# Patient Record
Sex: MALE | Race: OTHER | HISPANIC OR LATINO
[De-identification: names, ages, dates, MRNs, and addresses within clinical notes are randomized per-mention and may not be internally consistent; named-entity substitution may affect disease eponyms.]

---

## 2017-03-28 ENCOUNTER — APPOINTMENT (OUTPATIENT)
Dept: FAMILY MEDICINE | Facility: HOSPITAL | Age: 37
End: 2017-03-28

## 2017-03-28 ENCOUNTER — OUTPATIENT (OUTPATIENT)
Dept: OUTPATIENT SERVICES | Facility: HOSPITAL | Age: 37
LOS: 1 days | End: 2017-03-28
Payer: SELF-PAY

## 2017-03-28 VITALS
WEIGHT: 217 LBS | DIASTOLIC BLOOD PRESSURE: 73 MMHG | BODY MASS INDEX: 32.14 KG/M2 | HEART RATE: 63 BPM | HEIGHT: 69 IN | TEMPERATURE: 98 F | OXYGEN SATURATION: 100 % | SYSTOLIC BLOOD PRESSURE: 127 MMHG | RESPIRATION RATE: 14 BRPM

## 2017-03-31 PROCEDURE — G0463: CPT

## 2017-03-31 PROCEDURE — 36415 COLL VENOUS BLD VENIPUNCTURE: CPT

## 2017-03-31 PROCEDURE — 83036 HEMOGLOBIN GLYCOSYLATED A1C: CPT

## 2017-09-14 ENCOUNTER — APPOINTMENT (OUTPATIENT)
Dept: FAMILY MEDICINE | Facility: HOSPITAL | Age: 37
End: 2017-09-14

## 2018-02-19 ENCOUNTER — EMERGENCY (EMERGENCY)
Facility: HOSPITAL | Age: 38
LOS: 1 days | Discharge: HOME | End: 2018-02-19
Admitting: EMERGENCY MEDICINE
Payer: MEDICAID

## 2018-02-19 PROCEDURE — 85027 COMPLETE CBC AUTOMATED: CPT

## 2018-02-19 PROCEDURE — 99284 EMERGENCY DEPT VISIT MOD MDM: CPT

## 2018-02-19 PROCEDURE — 93010 ELECTROCARDIOGRAM REPORT: CPT

## 2018-02-19 PROCEDURE — 93005 ELECTROCARDIOGRAM TRACING: CPT

## 2018-02-19 PROCEDURE — 96374 THER/PROPH/DIAG INJ IV PUSH: CPT

## 2018-02-19 PROCEDURE — 96361 HYDRATE IV INFUSION ADD-ON: CPT

## 2018-02-19 PROCEDURE — 80307 DRUG TEST PRSMV CHEM ANLYZR: CPT

## 2018-02-19 PROCEDURE — 99284 EMERGENCY DEPT VISIT MOD MDM: CPT | Mod: 25

## 2018-02-19 PROCEDURE — 80048 BASIC METABOLIC PNL TOTAL CA: CPT

## 2018-02-26 ENCOUNTER — APPOINTMENT (OUTPATIENT)
Dept: FAMILY MEDICINE | Facility: HOSPITAL | Age: 38
End: 2018-02-26

## 2018-02-26 ENCOUNTER — OUTPATIENT (OUTPATIENT)
Dept: OUTPATIENT SERVICES | Facility: HOSPITAL | Age: 38
LOS: 1 days | End: 2018-02-26
Payer: SELF-PAY

## 2018-02-26 VITALS
HEIGHT: 69 IN | SYSTOLIC BLOOD PRESSURE: 138 MMHG | RESPIRATION RATE: 16 BRPM | WEIGHT: 220 LBS | TEMPERATURE: 98.1 F | OXYGEN SATURATION: 100 % | BODY MASS INDEX: 32.58 KG/M2 | HEART RATE: 84 BPM | DIASTOLIC BLOOD PRESSURE: 90 MMHG

## 2018-02-26 DIAGNOSIS — Z00.00 ENCOUNTER FOR GENERAL ADULT MEDICAL EXAMINATION WITHOUT ABNORMAL FINDINGS: ICD-10-CM

## 2018-02-26 PROCEDURE — G0463: CPT

## 2018-02-28 DIAGNOSIS — F10.239 ALCOHOL DEPENDENCE WITH WITHDRAWAL, UNSPECIFIED: ICD-10-CM

## 2018-03-09 ENCOUNTER — EMERGENCY (EMERGENCY)
Facility: HOSPITAL | Age: 38
LOS: 1 days | Discharge: HOME | End: 2018-03-09
Admitting: EMERGENCY MEDICINE
Payer: MEDICAID

## 2018-03-09 PROCEDURE — 93005 ELECTROCARDIOGRAM TRACING: CPT

## 2018-03-09 PROCEDURE — 96360 HYDRATION IV INFUSION INIT: CPT

## 2018-03-09 PROCEDURE — 93010 ELECTROCARDIOGRAM REPORT: CPT

## 2018-03-09 PROCEDURE — 81003 URINALYSIS AUTO W/O SCOPE: CPT

## 2018-03-09 PROCEDURE — 99283 EMERGENCY DEPT VISIT LOW MDM: CPT | Mod: 25

## 2018-03-09 PROCEDURE — 80053 COMPREHEN METABOLIC PANEL: CPT

## 2018-03-09 PROCEDURE — 85027 COMPLETE CBC AUTOMATED: CPT

## 2018-03-09 PROCEDURE — 71046 X-RAY EXAM CHEST 2 VIEWS: CPT | Mod: 26

## 2018-03-09 PROCEDURE — 99284 EMERGENCY DEPT VISIT MOD MDM: CPT

## 2018-03-09 PROCEDURE — 71046 X-RAY EXAM CHEST 2 VIEWS: CPT

## 2018-03-09 PROCEDURE — 84443 ASSAY THYROID STIM HORMONE: CPT

## 2018-03-27 ENCOUNTER — APPOINTMENT (OUTPATIENT)
Dept: FAMILY MEDICINE | Facility: HOSPITAL | Age: 38
End: 2018-03-27

## 2018-03-27 ENCOUNTER — OUTPATIENT (OUTPATIENT)
Dept: OUTPATIENT SERVICES | Facility: HOSPITAL | Age: 38
LOS: 1 days | End: 2018-03-27
Payer: SELF-PAY

## 2018-03-27 VITALS
OXYGEN SATURATION: 100 % | DIASTOLIC BLOOD PRESSURE: 89 MMHG | HEART RATE: 65 BPM | SYSTOLIC BLOOD PRESSURE: 132 MMHG | WEIGHT: 216 LBS | RESPIRATION RATE: 14 BRPM | BODY MASS INDEX: 31.9 KG/M2 | TEMPERATURE: 97.6 F

## 2018-03-27 DIAGNOSIS — A04.8 OTHER SPECIFIED BACTERIAL INTESTINAL INFECTIONS: ICD-10-CM

## 2018-03-27 DIAGNOSIS — Z00.00 ENCOUNTER FOR GENERAL ADULT MEDICAL EXAMINATION WITHOUT ABNORMAL FINDINGS: ICD-10-CM

## 2018-03-27 DIAGNOSIS — Z86.39 PERSONAL HISTORY OF OTHER ENDOCRINE, NUTRITIONAL AND METABOLIC DISEASE: ICD-10-CM

## 2018-03-27 DIAGNOSIS — Z86.19 PERSONAL HISTORY OF OTHER INFECTIOUS AND PARASITIC DISEASES: ICD-10-CM

## 2018-03-27 PROCEDURE — 82043 UR ALBUMIN QUANTITATIVE: CPT

## 2018-03-27 PROCEDURE — G0463: CPT

## 2018-03-27 RX ORDER — BLOOD-GLUCOSE METER
W/DEVICE KIT MISCELLANEOUS
Qty: 1 | Refills: 0 | Status: ACTIVE | COMMUNITY
Start: 2018-03-27 | End: 1900-01-01

## 2018-03-27 RX ORDER — DIMETHICONE 13 MG/ML
LOTION TOPICAL
Qty: 1 | Refills: 0 | Status: ACTIVE | COMMUNITY
Start: 2018-03-27 | End: 1900-01-01

## 2018-03-28 DIAGNOSIS — F41.9 ANXIETY DISORDER, UNSPECIFIED: ICD-10-CM

## 2018-03-28 DIAGNOSIS — E11.9 TYPE 2 DIABETES MELLITUS WITHOUT COMPLICATIONS: ICD-10-CM

## 2018-03-28 DIAGNOSIS — Z87.898 PERSONAL HISTORY OF OTHER SPECIFIED CONDITIONS: ICD-10-CM

## 2018-03-28 DIAGNOSIS — E66.9 OBESITY, UNSPECIFIED: ICD-10-CM

## 2018-04-07 ENCOUNTER — OUTPATIENT (OUTPATIENT)
Dept: OUTPATIENT SERVICES | Facility: HOSPITAL | Age: 38
LOS: 1 days | End: 2018-04-07
Payer: SELF-PAY

## 2018-04-07 DIAGNOSIS — E11.9 TYPE 2 DIABETES MELLITUS WITHOUT COMPLICATIONS: ICD-10-CM

## 2018-04-07 DIAGNOSIS — E66.9 OBESITY, UNSPECIFIED: ICD-10-CM

## 2018-04-07 PROCEDURE — 83036 HEMOGLOBIN GLYCOSYLATED A1C: CPT

## 2018-04-07 PROCEDURE — 80061 LIPID PANEL: CPT

## 2018-04-16 ENCOUNTER — APPOINTMENT (OUTPATIENT)
Dept: CARDIOLOGY | Facility: CLINIC | Age: 38
End: 2018-04-16

## 2018-05-04 LAB
HBA1C MFR BLD HPLC: 5.9
LDLC SERPL DIRECT ASSAY-MCNC: 140
MICROALBUMIN/CREAT 24H UR-RTO: NORMAL

## 2018-05-17 ENCOUNTER — RESULT CHARGE (OUTPATIENT)
Age: 38
End: 2018-05-17

## 2018-05-18 ENCOUNTER — APPOINTMENT (OUTPATIENT)
Dept: CARDIOLOGY | Facility: HOSPITAL | Age: 38
End: 2018-05-18
Payer: MEDICAID

## 2018-05-18 ENCOUNTER — OUTPATIENT (OUTPATIENT)
Dept: OUTPATIENT SERVICES | Facility: HOSPITAL | Age: 38
LOS: 1 days | End: 2018-05-18
Payer: SELF-PAY

## 2018-05-18 VITALS
RESPIRATION RATE: 16 BRPM | SYSTOLIC BLOOD PRESSURE: 129 MMHG | WEIGHT: 209 LBS | TEMPERATURE: 98.5 F | BODY MASS INDEX: 30.86 KG/M2 | HEART RATE: 70 BPM | OXYGEN SATURATION: 100 % | DIASTOLIC BLOOD PRESSURE: 78 MMHG

## 2018-05-18 DIAGNOSIS — Z00.00 ENCOUNTER FOR GENERAL ADULT MEDICAL EXAMINATION WITHOUT ABNORMAL FINDINGS: ICD-10-CM

## 2018-05-18 PROCEDURE — 93010 ELECTROCARDIOGRAM REPORT: CPT

## 2018-05-18 PROCEDURE — 93005 ELECTROCARDIOGRAM TRACING: CPT

## 2018-05-18 PROCEDURE — G0463: CPT

## 2018-05-21 DIAGNOSIS — Z87.828 PERSONAL HISTORY OF OTHER (HEALED) PHYSICAL INJURY AND TRAUMA: ICD-10-CM

## 2018-05-25 ENCOUNTER — OUTPATIENT (OUTPATIENT)
Dept: OUTPATIENT SERVICES | Facility: HOSPITAL | Age: 38
LOS: 1 days | End: 2018-05-25
Payer: SELF-PAY

## 2018-05-25 DIAGNOSIS — Z87.898 PERSONAL HISTORY OF OTHER SPECIFIED CONDITIONS: ICD-10-CM

## 2018-05-25 PROCEDURE — 93227 XTRNL ECG REC<48 HR R&I: CPT

## 2018-05-25 PROCEDURE — 93016 CV STRESS TEST SUPVJ ONLY: CPT

## 2018-05-25 PROCEDURE — 93306 TTE W/DOPPLER COMPLETE: CPT

## 2018-05-25 PROCEDURE — 93306 TTE W/DOPPLER COMPLETE: CPT | Mod: 26

## 2018-05-25 PROCEDURE — 93226 XTRNL ECG REC<48 HR SCAN A/R: CPT

## 2018-05-25 PROCEDURE — 93225 XTRNL ECG REC<48 HRS REC: CPT

## 2018-05-25 PROCEDURE — 93018 CV STRESS TEST I&R ONLY: CPT

## 2018-05-25 PROCEDURE — 93017 CV STRESS TEST TRACING ONLY: CPT

## 2018-06-14 ENCOUNTER — OUTPATIENT (OUTPATIENT)
Dept: OUTPATIENT SERVICES | Facility: HOSPITAL | Age: 38
LOS: 1 days | End: 2018-06-14
Payer: SELF-PAY

## 2018-06-14 ENCOUNTER — APPOINTMENT (OUTPATIENT)
Dept: FAMILY MEDICINE | Facility: HOSPITAL | Age: 38
End: 2018-06-14

## 2018-06-14 VITALS
SYSTOLIC BLOOD PRESSURE: 124 MMHG | HEART RATE: 69 BPM | WEIGHT: 208 LBS | BODY MASS INDEX: 30.72 KG/M2 | RESPIRATION RATE: 16 BRPM | DIASTOLIC BLOOD PRESSURE: 75 MMHG | OXYGEN SATURATION: 98 % | TEMPERATURE: 98.1 F

## 2018-06-14 DIAGNOSIS — Z00.00 ENCOUNTER FOR GENERAL ADULT MEDICAL EXAMINATION WITHOUT ABNORMAL FINDINGS: ICD-10-CM

## 2018-06-14 PROCEDURE — G0463: CPT

## 2018-06-14 NOTE — PHYSICAL EXAM
[No Acute Distress] : no acute distress [Well Nourished] : well nourished [Well Developed] : well developed [Well-Appearing] : well-appearing [No Respiratory Distress] : no respiratory distress  [Clear to Auscultation] : lungs were clear to auscultation bilaterally [No Accessory Muscle Use] : no accessory muscle use [Normal Rate] : normal rate  [Regular Rhythm] : with a regular rhythm [Normal S1, S2] : normal S1 and S2 [No Edema] : there was no peripheral edema [Soft] : abdomen soft [Non Tender] : non-tender [Non-distended] : non-distended [de-identified] : midline scar

## 2018-06-14 NOTE — HISTORY OF PRESENT ILLNESS
[FreeTextEntry1] : CP, Palpitations [de-identified] : 38 year old male with past medical history of Pre-DM, and history of ETOH abuse, presenting with complaints of intermittent palpitations and chest pressure. Symptoms occur intermittently when he 'thinks about his heart' and have been occurring more frequently. Last episode occurred Monday night when he was trying to sleep. Associated symptoms include Dry mouth, tingling in his fingers, and Anxiety. Cardiology work-up including Stress Test and Holter Monitor has been within normal limits. \par

## 2018-06-16 DIAGNOSIS — F41.9 ANXIETY DISORDER, UNSPECIFIED: ICD-10-CM

## 2018-08-29 LAB — MICROALBUMIN/CREAT 24H UR-RTO: NORMAL

## 2018-09-11 ENCOUNTER — APPOINTMENT (OUTPATIENT)
Dept: FAMILY MEDICINE | Facility: HOSPITAL | Age: 38
End: 2018-09-11

## 2018-09-11 ENCOUNTER — OUTPATIENT (OUTPATIENT)
Dept: OUTPATIENT SERVICES | Facility: HOSPITAL | Age: 38
LOS: 1 days | End: 2018-09-11
Payer: SELF-PAY

## 2018-09-11 VITALS
SYSTOLIC BLOOD PRESSURE: 123 MMHG | OXYGEN SATURATION: 99 % | WEIGHT: 203 LBS | HEART RATE: 74 BPM | BODY MASS INDEX: 29.98 KG/M2 | TEMPERATURE: 98.4 F | DIASTOLIC BLOOD PRESSURE: 80 MMHG | RESPIRATION RATE: 16 BRPM

## 2018-09-11 DIAGNOSIS — Z00.00 ENCOUNTER FOR GENERAL ADULT MEDICAL EXAMINATION WITHOUT ABNORMAL FINDINGS: ICD-10-CM

## 2018-09-12 DIAGNOSIS — E11.9 TYPE 2 DIABETES MELLITUS WITHOUT COMPLICATIONS: ICD-10-CM

## 2018-09-12 NOTE — PLAN
[FreeTextEntry1] : 38M w/PMH of T2DM, EtOH abuse presents for a T2DM checkup\par \par #T2DM\par - Controlled w/ diet, exercise and metformin\par - Last A1c 5.9 April 2018\par - F/U new A1c, lipids\par \par #EtOH abuse\par - Pt reports attempts to cut down on drinking, MD encouraged further abstinence\par \par #HM\par - F/U CBC, CMP, TSH\par - Flu shot today\par \par Case discussed w/ Dr Reis

## 2018-09-12 NOTE — HISTORY OF PRESENT ILLNESS
[FreeTextEntry1] : F/U T2DM [de-identified] : 38M w/PMH of T2DM, EtOH abuse presents for a T2DM checkup. Over past 2 years pt has followed diet and has been adherent w/ aerobic exercise 3x /weekly and has lost 30 lbs. Pt has also been adherent w/ metformin. Pt decreased A1c from 6.9 to 5.9 as of April 2018. Pt inquired about ceasing metformin if labs today show glucose under control because he does not want to consider himself "sick." Pt has no complaints at this time and reports tolerating metformin well.

## 2018-09-15 ENCOUNTER — OUTPATIENT (OUTPATIENT)
Dept: OUTPATIENT SERVICES | Facility: HOSPITAL | Age: 38
LOS: 1 days | End: 2018-09-15
Payer: SELF-PAY

## 2018-09-15 PROCEDURE — 80053 COMPREHEN METABOLIC PANEL: CPT

## 2018-09-15 PROCEDURE — 80061 LIPID PANEL: CPT

## 2018-09-15 PROCEDURE — 84443 ASSAY THYROID STIM HORMONE: CPT

## 2018-09-15 PROCEDURE — 83036 HEMOGLOBIN GLYCOSYLATED A1C: CPT

## 2018-09-15 PROCEDURE — G0463: CPT

## 2018-09-15 PROCEDURE — 85025 COMPLETE CBC W/AUTO DIFF WBC: CPT

## 2018-09-18 ENCOUNTER — RESULT REVIEW (OUTPATIENT)
Age: 38
End: 2018-09-18

## 2018-09-18 LAB
ALBUMIN SERPL ELPH-MCNC: 4.7 G/DL
ALP BLD-CCNC: 76 U/L
ALT SERPL-CCNC: 21 U/L
ANION GAP SERPL CALC-SCNC: 13 MMOL/L
AST SERPL-CCNC: 22 U/L
BASOPHILS # BLD AUTO: 0.02 K/UL
BASOPHILS NFR BLD AUTO: 0.4 %
BILIRUB SERPL-MCNC: 0.3 MG/DL
BUN SERPL-MCNC: 18 MG/DL
CALCIUM SERPL-MCNC: 9.7 MG/DL
CHLORIDE SERPL-SCNC: 104 MMOL/L
CHOLEST SERPL-MCNC: 180 MG/DL
CHOLEST/HDLC SERPL: 2.9 RATIO
CO2 SERPL-SCNC: 24 MMOL/L
CREAT SERPL-MCNC: 0.65 MG/DL
EOSINOPHIL # BLD AUTO: 0.07 K/UL
EOSINOPHIL NFR BLD AUTO: 1.2 %
GLUCOSE SERPL-MCNC: 88 MG/DL
HBA1C MFR BLD HPLC: 5.6 %
HCT VFR BLD CALC: 39.2 %
HDLC SERPL-MCNC: 63 MG/DL
HGB BLD-MCNC: 13.1 G/DL
IMM GRANULOCYTES NFR BLD AUTO: 0.2 %
LDLC SERPL CALC-MCNC: 108 MG/DL
LYMPHOCYTES # BLD AUTO: 1.93 K/UL
LYMPHOCYTES NFR BLD AUTO: 34.4 %
MAN DIFF?: NORMAL
MCHC RBC-ENTMCNC: 29.6 PG
MCHC RBC-ENTMCNC: 33.4 GM/DL
MCV RBC AUTO: 88.7 FL
MONOCYTES # BLD AUTO: 0.58 K/UL
MONOCYTES NFR BLD AUTO: 10.3 %
NEUTROPHILS # BLD AUTO: 3 K/UL
NEUTROPHILS NFR BLD AUTO: 53.5 %
PLATELET # BLD AUTO: 248 K/UL
POTASSIUM SERPL-SCNC: 4.5 MMOL/L
PROT SERPL-MCNC: 7.4 G/DL
RBC # BLD: 4.42 M/UL
RBC # FLD: 14.1 %
SODIUM SERPL-SCNC: 141 MMOL/L
TRIGL SERPL-MCNC: 45 MG/DL
TSH SERPL-ACNC: 1.2 UIU/ML
WBC # FLD AUTO: 5.61 K/UL

## 2018-12-18 ENCOUNTER — OUTPATIENT (OUTPATIENT)
Dept: OUTPATIENT SERVICES | Facility: HOSPITAL | Age: 38
LOS: 1 days | End: 2018-12-18
Payer: SELF-PAY

## 2018-12-18 ENCOUNTER — APPOINTMENT (OUTPATIENT)
Dept: FAMILY MEDICINE | Facility: HOSPITAL | Age: 38
End: 2018-12-18

## 2018-12-18 ENCOUNTER — MED ADMIN CHARGE (OUTPATIENT)
Age: 38
End: 2018-12-18

## 2018-12-18 ENCOUNTER — RESULT CHARGE (OUTPATIENT)
Age: 38
End: 2018-12-18

## 2018-12-18 VITALS
TEMPERATURE: 97.9 F | BODY MASS INDEX: 31.01 KG/M2 | RESPIRATION RATE: 16 BRPM | SYSTOLIC BLOOD PRESSURE: 129 MMHG | OXYGEN SATURATION: 99 % | DIASTOLIC BLOOD PRESSURE: 86 MMHG | HEART RATE: 79 BPM | WEIGHT: 210 LBS

## 2018-12-18 DIAGNOSIS — Z00.00 ENCOUNTER FOR GENERAL ADULT MEDICAL EXAMINATION WITHOUT ABNORMAL FINDINGS: ICD-10-CM

## 2018-12-18 LAB — HBA1C MFR BLD HPLC: 6

## 2018-12-18 PROCEDURE — 82043 UR ALBUMIN QUANTITATIVE: CPT

## 2018-12-18 PROCEDURE — G0010: CPT

## 2018-12-18 PROCEDURE — 90471 IMMUNIZATION ADMIN: CPT

## 2018-12-18 PROCEDURE — G0009: CPT

## 2018-12-18 PROCEDURE — G0463: CPT

## 2018-12-18 NOTE — HISTORY OF PRESENT ILLNESS
[de-identified] : 39 YO M with DM2 diagnosed in 2016 with A1C of 6.9 presenting for follow up.  DM is controlled with last A1C of 5.6.  Has been compliant with Metformin 500mg BID.  Reports that he did not know that he was a diabetic, believed he was Pre-Diabetic.  Denies urinary frequency, neuropathy or visual changes.

## 2018-12-18 NOTE — ASSESSMENT
[FreeTextEntry1] : #DM2\par - Controlled\par - A1C: 6.0 in office today, increased from 5.6\par - Continue Metformin 500mg BID\par - Check urine micro\par \par #Need for Tdap, HepB#3 and PPSV23\par - Tdap and PPSV given in office today\par - Patient believes he received the 3rd HepB in another hospital, will bring records\par \par RTC in 3 months for DM follow up

## 2018-12-19 DIAGNOSIS — E11.9 TYPE 2 DIABETES MELLITUS WITHOUT COMPLICATIONS: ICD-10-CM

## 2018-12-20 LAB
CREAT SPEC-SCNC: 112 MG/DL
MICROALBUMIN 24H UR DL<=1MG/L-MCNC: <1.2 MG/DL
MICROALBUMIN/CREAT 24H UR-RTO: NORMAL

## 2019-02-12 ENCOUNTER — RX RENEWAL (OUTPATIENT)
Age: 39
End: 2019-02-12

## 2019-02-26 DIAGNOSIS — Z00.00 ENCOUNTER FOR GENERAL ADULT MEDICAL EXAMINATION WITHOUT ABNORMAL FINDINGS: ICD-10-CM

## 2019-06-03 ENCOUNTER — RX RENEWAL (OUTPATIENT)
Age: 39
End: 2019-06-03

## 2019-06-11 ENCOUNTER — APPOINTMENT (OUTPATIENT)
Dept: FAMILY MEDICINE | Facility: HOSPITAL | Age: 39
End: 2019-06-11

## 2019-08-05 ENCOUNTER — APPOINTMENT (OUTPATIENT)
Dept: FAMILY MEDICINE | Facility: HOSPITAL | Age: 39
End: 2019-08-05

## 2019-08-05 ENCOUNTER — OUTPATIENT (OUTPATIENT)
Dept: OUTPATIENT SERVICES | Facility: HOSPITAL | Age: 39
LOS: 1 days | End: 2019-08-05
Payer: SELF-PAY

## 2019-08-05 VITALS
RESPIRATION RATE: 14 BRPM | SYSTOLIC BLOOD PRESSURE: 127 MMHG | HEART RATE: 85 BPM | DIASTOLIC BLOOD PRESSURE: 87 MMHG | WEIGHT: 219 LBS | BODY MASS INDEX: 32.34 KG/M2 | OXYGEN SATURATION: 98 % | TEMPERATURE: 98.1 F

## 2019-08-05 DIAGNOSIS — Z00.00 ENCOUNTER FOR GENERAL ADULT MEDICAL EXAMINATION WITHOUT ABNORMAL FINDINGS: ICD-10-CM

## 2019-08-05 PROCEDURE — G0463: CPT

## 2019-08-05 NOTE — ASSESSMENT
[FreeTextEntry1] : 39M w/ PMH as above comes in for evaluation of back pain.\par \par 1. Acute back pain\par Continue Aleve/tylenol/motrin PRN\par Provided home back exercises to patient\par Will refer to PT and consider imaging if not improved by next visit\par \par RTC in 1 month for f/u back pain

## 2019-08-05 NOTE — PHYSICAL EXAM
[No Acute Distress] : no acute distress [Well Developed] : well developed [Well Nourished] : well nourished [No Spinal Tenderness] : no spinal tenderness [Well-Appearing] : well-appearing [Normal Affect] : the affect was normal [Alert and Oriented x3] : oriented to person, place, and time [Normal Insight/Judgement] : insight and judgment were intact [de-identified] : No back pain on palpation. No banding appreciated. Negative straight leg raise. No asymmetry appreciated. [de-identified] : N

## 2019-08-05 NOTE — HISTORY OF PRESENT ILLNESS
[FreeTextEntry8] : 39M w/ PMH of alcohol dependence, anxiety, DM2 controlled, fatty liver, obesity comes in requesting back XR. Pt reports ~1 week of back  pain, 2 locations - lower central back and mid central. Pain has been improving over past week. Saw chiropractor 3 weeks ago. Has not been taking any medications for pain except once took Aleve which helped pain. Denies pain radiating anywhere, paresthesias, numbness, weakness of legs, difficulty urinating/defecating. Does feel areas of numbness at lower back where pain is.\par \par Works in construction.

## 2019-08-06 DIAGNOSIS — M54.9 DORSALGIA, UNSPECIFIED: ICD-10-CM

## 2019-11-12 ENCOUNTER — RX RENEWAL (OUTPATIENT)
Age: 39
End: 2019-11-12

## 2019-12-04 ENCOUNTER — OUTPATIENT (OUTPATIENT)
Dept: OUTPATIENT SERVICES | Facility: HOSPITAL | Age: 39
LOS: 1 days | End: 2019-12-04
Payer: SELF-PAY

## 2019-12-04 ENCOUNTER — APPOINTMENT (OUTPATIENT)
Dept: FAMILY MEDICINE | Facility: HOSPITAL | Age: 39
End: 2019-12-04

## 2019-12-04 ENCOUNTER — RESULT CHARGE (OUTPATIENT)
Age: 39
End: 2019-12-04

## 2019-12-04 ENCOUNTER — MED ADMIN CHARGE (OUTPATIENT)
Age: 39
End: 2019-12-04

## 2019-12-04 DIAGNOSIS — Z00.00 ENCOUNTER FOR GENERAL ADULT MEDICAL EXAMINATION WITHOUT ABNORMAL FINDINGS: ICD-10-CM

## 2019-12-04 DIAGNOSIS — Z86.59 PERSONAL HISTORY OF OTHER MENTAL AND BEHAVIORAL DISORDERS: ICD-10-CM

## 2019-12-04 DIAGNOSIS — F10.239 ALCOHOL DEPENDENCE WITH WITHDRAWAL, UNSPECIFIED: ICD-10-CM

## 2019-12-04 PROCEDURE — G0463: CPT

## 2019-12-04 PROCEDURE — G0008: CPT

## 2019-12-04 RX ORDER — PAROXETINE HYDROCHLORIDE 20 MG/1
20 TABLET, FILM COATED ORAL DAILY
Qty: 30 | Refills: 0 | Status: DISCONTINUED | COMMUNITY
Start: 2018-06-14 | End: 2019-12-04

## 2019-12-06 DIAGNOSIS — K43.9 VENTRAL HERNIA WITHOUT OBSTRUCTION OR GANGRENE: ICD-10-CM

## 2019-12-06 DIAGNOSIS — E11.9 TYPE 2 DIABETES MELLITUS WITHOUT COMPLICATIONS: ICD-10-CM

## 2019-12-06 DIAGNOSIS — T15.92XA FOREIGN BODY ON EXTERNAL EYE, PART UNSPECIFIED, LEFT EYE, INITIAL ENCOUNTER: ICD-10-CM

## 2019-12-09 NOTE — PHYSICAL EXAM
[Normal] : no respiratory distress, lungs were clear to auscultation bilaterally and no accessory muscle use [FreeTextEntry1] : +midline incisional scar noted from prior laprotomy, +small hernia noted superior to the umbilicus on the right side of abbdominal wall adjaced to the incision, no evidence of incarceration or strangulation noted

## 2019-12-09 NOTE — ASSESSMENT
[FreeTextEntry1] : 40 y/o M with PMHx as above \par \par 1) Diabetes \par A1c:5.8\par continue with Metformin 500mg BID \par diet and exercise \par \par \par 2) Foreign body sensation of the left eye\par -ophthalmology referral given \par \par \par 3) Ventral Hernia \par -no evidence of incarceration or strangulation noted on the exam\par -general surgery referral given \par \par \par 4) Health Maintainence \par -flu vaccination given \par \par RTC in 3 months \par \par Pt seen, case and plan discussed with Dr. Benites.

## 2019-12-09 NOTE — HISTORY OF PRESENT ILLNESS
[FreeTextEntry1] : follow-up on diabetes  [de-identified] : 38 y/o with PMHx of alcohol dependence, anxiety, Diabetes, fatty liver and obesity present to clinic for f/u on diabetes. Reports compliance with his medications. Does not check his blood sugars at home. Pt also states that while working outside he noticed "something fell his eye". States happened 2 months ago. No medical evaluation at that time. No change in vision, no blurry vision. Denies contact use.

## 2019-12-09 NOTE — END OF VISIT
[FreeTextEntry3] : pt seen with resident at the time of visit and we discussed his management. [] : Resident

## 2019-12-09 NOTE — REVIEW OF SYSTEMS
[Itching] : itching [Negative] : Neurological [Pain] : no pain [Discharge] : no discharge [Redness] : no redness [Vision Problems] : no vision problems

## 2020-02-01 ENCOUNTER — TRANSCRIPTION ENCOUNTER (OUTPATIENT)
Age: 40
End: 2020-02-01

## 2020-02-02 ENCOUNTER — EMERGENCY (EMERGENCY)
Facility: HOSPITAL | Age: 40
LOS: 1 days | Discharge: HOME | End: 2020-02-02
Attending: EMERGENCY MEDICINE | Admitting: EMERGENCY MEDICINE
Payer: SUBSIDIZED

## 2020-02-02 VITALS
SYSTOLIC BLOOD PRESSURE: 132 MMHG | TEMPERATURE: 99 F | DIASTOLIC BLOOD PRESSURE: 87 MMHG | OXYGEN SATURATION: 98 % | RESPIRATION RATE: 18 BRPM | HEART RATE: 89 BPM | WEIGHT: 212.08 LBS | HEIGHT: 67 IN

## 2020-02-02 DIAGNOSIS — S01.551A OPEN BITE OF LIP, INITIAL ENCOUNTER: ICD-10-CM

## 2020-02-02 DIAGNOSIS — S01.511A LACERATION WITHOUT FOREIGN BODY OF LIP, INITIAL ENCOUNTER: ICD-10-CM

## 2020-02-02 PROCEDURE — 82962 GLUCOSE BLOOD TEST: CPT

## 2020-02-02 PROCEDURE — 99284 EMERGENCY DEPT VISIT MOD MDM: CPT | Mod: 25

## 2020-02-02 PROCEDURE — 99284 EMERGENCY DEPT VISIT MOD MDM: CPT

## 2020-02-02 PROCEDURE — 12013 RPR F/E/E/N/L/M 2.6-5.0 CM: CPT

## 2020-02-02 RX ADMIN — Medication 1 TABLET(S): at 17:03

## 2020-02-02 NOTE — CONSULT NOTE ADULT - PROBLEM SELECTOR RECOMMENDATION 9
pt informed to rinse mouth after each meal, clean suture line gently with soap and water, take antibiotics as prescribed, followup on 2/11/20 out patient or followup sooner in ER if pus drains.  Case discussed with ER team

## 2020-02-02 NOTE — ED PROVIDER NOTE - OBJECTIVE STATEMENT
Pt is 40 y/o male with PMhx of pre-DM here for eval s/p mechanical fall. As per pt he tripped over uneven curb and fell face forward, sustained laceration to Lt upper lip, no LOC reported, admits that he was drinking etoh today; Denies neck or back pain, denies HA, dizziness, visual changes, denies additional injuries, last tdap 4 months ago.

## 2020-02-02 NOTE — ED PROVIDER NOTE - NSFOLLOWUPINSTRUCTIONS_ED_ALL_ED_FT
PLEASE FINISH FULL COURSE OF ANTIBIOTICS AND KEEP THE AREA CLEAN;  Laceración facial  Facial Laceration  [place] laceración facial es un esthela (laceración) en la sangeetha. Puede sufrir belgica laceración facial debido a un accidente o belgica lesión que corta o desgarra la piel o los tejidos de la sangeetha. Las laceraciones faciales pueden sangrar y ser dolorosas. Es posible que necesite atención médica para detener el sangrado, ayudar a sanar la herida, disminuir el riesgo de infección y prevenir las cicatrices. Las laceraciones generalmente sanan rápidamente después del tratamiento.  ¿Cuáles son las causas?  Las laceraciones faciales a menudo son causadas por:  Un accidente automovilístico.[place] lesión deportiva.Un ataque violento.Belgica caída.¿Cuáles son los signos o los síntomas?  Los síntomas frecuentes de esta afección incluyen los siguientes:  Un esthela evidente en la sangeetha.Hemorragia.Dolor.Hinchazón.Moretones.Un cambio en la apariencia de la sangeetha (deformidad).¿Cómo se diagnostica?  Byrne médico puede diagnosticar belgica laceración facial mediante un examen físico y preguntándole cómo ocurrió la lesión. Byrne médico también verificará las áreas de sangrado, el daño tisular, la presencia de belgica lesión nerviosa y un cuerpo extraño en la herida.  ¿Cómo se trata?  El tratamiento de belgica laceración facial depende de qué porter grave y profunda sea la herida. También depende del riesgo de infección. [place], byrne médico limpiará la herida para prevenir belgica infección. A continuación, byrne médico decidirá si es necesario cerrar la herida. [place] dependerá de la profundidad de la laceración y del tiempo que haya pasado desde que ocurrió la lesión. Si el riesgo de infección es alto, la herida no se cerrará.  Si es necesario cerrar la herida:  Byrne médico usará puntos (suturas), goma para cerrar la piel (adhesivo para la piel) o tiras adhesivas para la piel para reparar la laceración.El médico nicole puede adormecer el área alrededor de la herida inyectando un medicamento anestésico (anestésico local) alrededor de la laceración antes de realizar las suturas.Los bordes de la piel rasgada o la piel muerta se pueden retirar.Si para cerrar la herida se usan suturas, la laceración puede cerrarse en capas. Se usarán suturas absorbibles para los tejidos profundos y los músculos. Se usarán suturas extraíbles para cerrar la piel.Le administrarán lo siguiente:  Analgésicos.Vacuna antitetánica.Antibióticos por vía oral.Ungüento antibiótico.Siga estas indicaciones en byrne casa:  Cuidado de la herida     Siga las indicaciones de byrne médico para el cuidado de la herida. Estas instrucciones variarán dependiendo de cómo se haya cerrado la herida.  Si tiene suturas:   Mantenga la herida limpia y seca.Si le colocaron belgica venda (vendaje), cámbiela al menos belgica vez al día, o teresa se lo haya indicado el médico. También cámbiela si se moja o se ensucia.Lave la herida con jabón y agua tibia dos veces al día, o teresa se lo haya indicado el médico. Enjuague el jabón con agua. Seque dando palmaditas con belgica toalla limpia y seca.Después de limpiarla, aplique belgica delgada capa de ungüento con antibiótico teresa se lo haya indicado el médico. [place] ayuda a prevenir infecciones y a evitar que el vendaje se adhiera a la herida.Puede ducharse después de las primeras 24 horas. No moje la herida hasta que le hayan quitado las suturas.Vuelva a que le quiten las suturas cuando se lo indique el médico.No use maquillaje hasta que el médico lo autorice.Si tiene adhesivo para la piel:   Podrá mojar momentáneamente la herida en la ducha o el baño.No frote ni sumerja la herida.No practique natación.No transpire con abundancia hasta que el adhesivo para la piel se haya caído solo.Después de ducharse o darse un baño, seque el esthela dando palmaditas con belgica toalla limpia.No aplique medicamentos líquidos, en crema o ungüentos, ni maquillaje en la herida mientras el adhesivo para la piel esté en byrne lugar. Podrá aflojarlo antes de que la herida se cure.Si tiene un vendaje sobre la herida, tenga cuidado de no aplicar cinta adhesiva directamente sobre el adhesivo para la piel. [place] puede hacer que el adhesivo se desprenda antes de que la herida se haya curado.No pase mucho tiempo al sol ni use belgica lámpara para bronceado mientras el adhesivo para la piel esté en byrne lugar.Por lo general, el adhesivo para la piel permanecerá en byrne lugar skye 5 a 10 días y, luego, se desprenderá naturalmente. No manosee el adhesivo para la piel.En rafael de que tenga tiras adhesivas:   Mantenga la herida limpia y seca.No permita que las tiras adhesivas para la piel se mojen.Báñese con cuidado para mantener la herida y las tiras adhesivas secas. Si la herida se moja, séquela sin frotar inmediatamente con belgica toalla limpia.Con el tiempo, las tiras adhesivas para la piel se caerán solas. Puede recortar las tiras a medida que la herida se nadeen. No quite las tiras adhesivas para la piel que aún estén pegadas a la herida.Instrucciones generales        Controle la raúl de la herida todos los días para detectar signos de infección. Esté atento a los siguientes signos:  Dolor, hinchazón o enrojecimiento.Líquido o minnie.Calor.Pus o mal olor.[place] los medicamentos de venta daphney y los recetados solamente teresa se lo haya indicado el médico.Si le recetaron un antibiótico, tómelo o aplíqueselo teresa se lo haya indicado el médico. No deje de usar el antibiótico, ni siquiera si el cuadro clínico mejora.Después de que la laceración haya sanado:  Sepa que puede demorar un año o dos para que el enrojecimiento o la cicatrización desaparezcan.Aplique protector solar sobre la piel de byrne herida curada para reducir al mínimo las cicatrices. Los hoang ultravioleta (UV) pueden oscurecer el tejido cicatricial.Comuníquese con un médico si:  Tiene fiebre.Tiene enrojecimiento, hinchazón o dolor alrededor de la herida.Observa líquido o minnie que salen de la herida.La herida está caliente al tacto.Tiene pus o percibe mal olor que emana de la herida.[place]ite ayuda de inmediato si:  Tiene belgica línea laura que sale de la herida.Resumen  Es posible que necesite tratamiento para belgica laceración facial a fin de evitar infecciones, detener el sangrado, ayudar a la curación y prevenir las cicatrices.Belg[place] laceración profunda puede cerrarse con puntos (suturas).Siga cuidadosamente las indicaciones del médico sobre el cuidado de la herida. PLEASE FINISH FULL COURSE OF ANTIBIOTICS AND KEEP THE AREA CLEAN;  Laceración facial  Facial Laceration  [place] laceración facial es un esthela (laceración) en la sangeetha. Puede sufrir belgica laceración facial debido a un accidente o belgica lesión que corta o desgarra la piel o los tejidos de la sangeetha. Las laceraciones faciales pueden sangrar y ser dolorosas. Es posible que necesite atención médica para detener el sangrado, ayudar a sanar la herida, disminuir el riesgo de infección y prevenir las cicatrices. Las laceraciones generalmente sanan rápidamente después del tratamiento.  ¿Cuáles son las causas?  Las laceraciones faciales a menudo son causadas por:  Un accidente automovilístico.Belgica lesión deportiva.Un ataque violento.Belgica caída.¿Cuáles son los signos o los síntomas?  Los síntomas frecuentes de esta afección incluyen los siguientes:  Un esthela evidente en la sangeetha.Hemorragia.Dolor.Hinchazón.[place]ton[place].Un cambio en la apariencia de la sangeetha (deformidad).¿Cómo se diagnostica?  Byrne médico puede diagnosticar belgica laceración facial mediante un examen físico y preguntándole cómo ocurrió la lesión. Byr[place] médico también verificará las áreas de sangrado, el daño tisular, la presencia de belgica lesión nerviosa y un cuerpo extraño en la herida.  ¿Cómo se trata?  El tratamiento de belgica laceración facial depende de qué porter grave y profunda sea la herida. También depende del riesgo de infección. [place], byrne médico limpiará la herida para prevenir belgica infección. A continuación, byrne médico decidirá si es necesario cerrar la herida. [place] dependerá de la profundidad de la laceración y del tiempo que haya pasado desde que ocurrió la lesión. Si el riesgo de infección es alto, la herida no se cerrará.  Si es necesario cerrar la herida:  Byrne médico usará puntos (suturas), goma para cerrar la piel (adhesivo para la piel) o tiras adhesivas para la piel para reparar la laceración.El médico vaughn puede adormecer el área alrededor de la herida inyectando un medicamento anestésico (anestésico local) alrededor de la laceración antes de realizar las suturas.Los bordes de la piel rasgada o la piel muerta se pueden retirar.Si para cerrar la herida se usan suturas, la laceración puede cerrarse en capas. Se usarán suturas absorbibles para los tejidos profundos y los músculos. Se usarán suturas extraíbles para cerrar la piel.Le administrarán lo siguiente:  Analgésicos.Vacuna antitetánica.Antibióticos por vía oral.Ungüento antibiótico.Siga estas indicaciones en byrne casa:  Cuidado de la herida     Siga las indicaciones de byrne médico para el cuidado de la herida. Estas instrucciones variarán dependiendo de cómo se haya cerrado la herida.  Si tiene suturas:   Mantenga la herida limpia y seca.Si le colocaron belgica venda (vendaje), cámbiela al menos belgica vez al día, o teresa se lo haya indicado el médico. También cámbiela si se moja o se ensucia.Lave la herida con jabón y agua tibia dos veces al día, o teresa se lo haya indicado el médico. Enjuague el jabón con agua. Seque dando palmaditas con belgica toalla limpia y seca.Después de limpiarla, aplique belgica delgada capa de ungüento con antibiótico teresa se lo haya indicado el médico. [place] ayuda a prevenir infecciones y a evitar que el vendaje se adhiera a la herida.Puede ducharse después de las primeras 24 horas. No moje la herida hasta que le hayan quitado las suturas.Vuelva a que le quiten las suturas cuando se lo indique el médico.No use maquillaje hasta que el médico lo autorice.Si tiene adhesivo para la piel:   Podrá mojar momentáneamente la herida en la ducha o el baño.No frote ni sumerja la herida.No practique natación.No transpire con abundancia hasta que el adhesivo para la piel se haya caído solo.Después de ducharse o darse un baño, seque el esthela dando palmaditas con belgica toalla limpia.No aplique medicamentos líquidos, en crema o ungüentos, ni maquillaje en la herida mientras el adhesivo para la piel esté en byrne lugar. Podrá aflojarlo antes de que la herida se cure.Si tiene un vendaje sobre la herida, tenga cuidado de no aplicar cinta adhesiva directamente sobre el adhesivo para la piel. [place] puede hacer que el adhesivo se desprenda antes de que la herida se haya curado.No pase mucho tiempo al sol ni use belgica lámpara para bronceado mientras el adhesivo para la piel esté en byrne lugar.Por lo general, el adhesivo para la piel permanecerá en byrne lugar skye 5 a 10 días y, luego, se desprenderá naturalmente. No manosee el adhesivo para la piel.En rafael de que tenga tiras adhesivas:   Mantenga la herida limpia y seca.No permita que las tiras adhesivas para la piel se mojen.Báñese con cuidado para mantener la herida y las tiras adhesivas secas. Si la herida se moja, séquela sin frotar inmediatamente con belgica toalla limpia.Con el tiempo, las tiras adhesivas para la piel se caerán solas. Puede recortar las tiras a medida que la herida se nadeen. No quite las tiras adhesivas para la piel que aún estén pegadas a la herida.Instrucciones generales        Controle la raúl de la herida todos los días para detectar signos de infección. Esté atento a los siguientes signos:  Dolor, hinchazón o enrojecimiento.Líquido o minnie.Calor.Pus o mal olor.[place] los medicamentos de venta daphney y los recetados solamente teresa se lo haya indicado el médico.Si le recetaron un antibiótico, tómelo o aplíqueselo teresa se lo haya indicado el médico. No deje de usar el antibiótico, ni siquiera si el cuadro clínico mejora.Después de que la laceración haya sanado:  Sepa que puede demorar un año o dos para que el enrojecimiento o la cicatrización desaparezcan.Aplique protector solar sobre la piel de byrne herida curada para reducir al mínimo las cicatrices. Los hoang ultravioleta (UV) pueden oscurecer el tejido cicatricial.Comuníquese con un médico si:  Tiene fiebre.Tiene enrojecimiento, hinchazón o dolor alrededor de la herida.Observa líquido o minnie que salen de la herida.La herida está caliente al tacto.Tiene pus o percibe mal olor que emana de la herida.[place] ayuda de inmediato si:  Tiene belgica línea laura que sale de la herida.Resumen  Es posible que necesite tratamiento para belgica laceración facial a fin de evitar infecciones, detener el sangrado, ayudar a la curación y prevenir las cicatrices.[place] laceración profunda puede cerrarse con puntos (suturas).Siga cuidadosamente las indicaciones del médico sobre el cuidado de la herida.  Mordeduras de animales en adultos  Animal Bite, Adult  Las heridas por mordeduras de animales pueden ser leves o graves. Es importante obtener tratamiento médico para prevenir belgica infección. Pregunte al médico si necesita tratamiento para prevenir belgica infección que pueda contagiarse de animales a seres humanos (sabrina).  Siga estas indicaciones en byrne casa:  Cuidados de la herida        Siga las indicaciones del médico en lo que respecta al cuidado de la herida. [place] lo siguiente:  Lávese las dona con agua y jabón antes de cambiar la venda (vendaje). Use un desinfectante para dona si no dispone de agua y jabón.Cambie el vendaje teresa se lo haya indicado el médico.No retire los puntos (suturas), la goma para cerrar la piel o las tiras adhesivas. [place] vez deban dejarse puestos en la piel skye 2 semanas o más tiempo. Si las tiras adhesivas se despegan y se enroscan, puede recortar los bordes sueltos. No retire las tiras adhesivas por completo a menos que el médico lo autorice.Controle la herida todos los días para detectar signos de infección. Esté atento a los siguientes signos:  Aumento del enrojecimiento, de la hinchazón o del dolor.Más líquido o minnie.Calor.Pus o mal olor.Medicamentos     [place] o aplique los medicamentos de venta daphney y los recetados solamente teresa se lo haya indicado el médico.Si le recetaron un antibiótico, tómelo o aplíquelo teresa se lo haya indicado el médico. No deje de usar el antibiótico aunque la herida mejore.Instrucciones generales        Cuando esté sentado o acostado, mantenga la raúl de la herida elevada por encima del nivel del corazón.Si se lo indican, aplique hielo sobre la raúl de la lesión.  Ponga el hielo en belgica bolsa plástica.Coloque belgica toalla entre la piel y la bolsa de hielo.Coloque el hielo skye 20 minutos, de 2 a 3 veces por día.Concurra a todas las visitas de control teresa se lo haya indicado el médico. [place] es importante.Comuníquese con un médico si:  Tiene más enrojecimiento, hinchazón o dolor alrededor de la herida.La herida está caliente al tacto.Tiene fiebre o siente escalofríos.Tiene belgica sensación de malestar general.Siente malestar estomacal (náuseas).Vomita.Tiene un dolor que no se ekta.Solicite ayuda de inmediato si:  Tiene belgica línea laura que sale de la herida.Alguna de estas sustancias emana de la herida:  Líquido no negrita.Más minnie.Pus o mal olor.Tiene dificultad para  la raúl de la herida.Pierde la sensibilidad (tiene adormecimiento) o siente hormigueo en cualquier parte del cuerpo.Resumen  Es importante recibir el tratamiento médico adecuado para mordeduras de animales. El tratamiento puede ayudar a evitar que contraiga belgica infección. Pregunte al médico si necesita tratamiento para prevenir belgica infección que pueda contagiarse de animales a seres humanos (sabrina).Controle la herida todos los días para detectar signos de infección, teresa aumento, en lugar de disminución, del enrojecimiento o la hinchazón.Obtenga ayuda de inmediato si tiene belgica línea laura que sale de la herida.Esta información no tiene teresa fin reemplazar el consejo del médico. Asegúrese de hacerle al médico cualquier pregunta que tenga. PLEASE FINISH FULL COURSE OF ANTIBIOTICS AND KEEP THE AREA CLEAN; don't shave; FOLLOW UP WITH DR PALACIOS ON TUESDAY 2/4;  Laceración facial  Facial Laceration  [place] laceración facial es un esthela (laceración) en la sangeetha. Puede sufrir belgica laceración facial debido a un accidente o belgica lesión que corta o desgarra la piel o los tejidos de la sangeetha. Las laceraciones faciales pueden sangrar y ser dolorosas. Es posible que necesite atención médica para detener el sangrado, ayudar a sanar la herida, disminuir el riesgo de infección y prevenir las cicatrices. Las laceraciones generalmente sanan rápidamente después del tratamiento.  ¿Cuáles son las causas?  Las laceraciones faciales a menudo son causadas por:  Un accidente automovilístico.Belgica lesión deportiva.Un ataque violento.[place] caída.¿Cuáles son los signos o los síntomas?  Los síntomas frecuentes de esta afección incluyen los siguientes:  Un esthela evidente en la sangeetha.Hemorragia.Dolor.Hinchazón.[place].Un cambio en la apariencia de la sangeetha (deformidad).¿Cómo se diagnostica?  Byrne médico puede diagnosticar belgica laceración facial mediante un examen físico y preguntándole cómo ocurrió la lesión. Byr[place] médico también verificará las áreas de sangrado, el daño tisular, la presencia de belgica lesión nerviosa y un cuerpo extraño en la herida.  ¿Cómo se trata?  El tratamiento de belgica laceración facial depende de qué porter grave y profunda sea la herida. También depende del riesgo de infección. [place], byrne médico limpiará la herida para prevenir belgica infección. A continuación, byrne médico decidirá si es necesario cerrar la herida. [place] dependerá de la profundidad de la laceración y del tiempo que haya pasado desde que ocurrió la lesión. Si el riesgo de infección es alto, la herida no se cerrará.  Si es necesario cerrar la herida:  Byrne médico usará puntos (suturas), goma para cerrar la piel (adhesivo para la piel) o tiras adhesivas para la piel para reparar la laceración.El médico vaughn puede adormecer el área alrededor de la herida inyectando un medicamento anestésico (anestésico local) alrededor de la laceración antes de realizar las suturas.Los bordes de la piel rasgada o la piel muerta se pueden retirar.Si para cerrar la herida se usan suturas, la laceración puede cerrarse en capas. Se usarán suturas absorbibles para los tejidos profundos y los músculos. Se usarán suturas extraíbles para cerrar la piel.Le administrarán lo siguiente:  Analgésicos.Vacuna antitetánica.Antibióticos por vía oral.Ungüento antibiótico.Siga estas indicaciones en byrne casa:  Cuidado de la herida     Siga las indicaciones de byrne médico para el cuidado de la herida. Estas instrucciones variarán dependiendo de cómo se haya cerrado la herida.  Si tiene suturas:   Mantenga la herida limpia y seca.Si le colocaron belgica venda (vendaje), cámbiela al menos belgica vez al día, o teresa se lo haya indicado el médico. También cámbiela si se moja o se ensucia.Lave la herida con jabón y agua tibia dos veces al día, o teresa se lo haya indicado el médico. Enjuague el jabón con agua. Seque dando palmaditas con belgica toalla limpia y seca.Después de limpiarla, aplique belgica delgada capa de ungüento con antibiótico teresa se lo haya indicado el médico. [place] ayuda a prevenir infecciones y a evitar que el vendaje se adhiera a la herida.Puede ducharse después de las primeras 24 horas. No moje la herida hasta que le hayan quitado las suturas.Vuelva a que le quiten las suturas cuando se lo indique el médico.No use maquillaje hasta que el médico lo autorice.Si tiene adhesivo para la piel:   Podrá mojar momentáneamente la herida en la ducha o el baño.No frote ni sumerja la herida.No practique natación.No transpire con abundancia hasta que el adhesivo para la piel se haya caído solo.Después de ducharse o darse un baño, seque el esthela dando palmaditas con belgica toalla limpia.No aplique medicamentos líquidos, en crema o ungüentos, ni maquillaje en la herida mientras el adhesivo para la piel esté en byrne lugar. Podrá aflojarlo antes de que la herida se cure.Si tiene un vendaje sobre la herida, tenga cuidado de no aplicar cinta adhesiva directamente sobre el adhesivo para la piel. [place] puede hacer que el adhesivo se desprenda antes de que la herida se haya curado.No pase mucho tiempo al sol ni use belgica lámpara para bronceado mientras el adhesivo para la piel esté en byrne lugar.Por lo general, el adhesivo para la piel permanecerá en byrne lugar skye 5 a 10 días y, luego, se desprenderá naturalmente. No manosee el adhesivo para la piel.En rafael de que tenga tiras adhesivas:   Mantenga la herida limpia y seca.No permita que las tiras adhesivas para la piel se mojen.Báñese con cuidado para mantener la herida y las tiras adhesivas secas. Si la herida se moja, séquela sin frotar inmediatamente con belgica toalla limpia.Con el tiempo, las tiras adhesivas para la piel se caerán solas. Puede recortar las tiras a medida que la herida se nadeen. No quite las tiras adhesivas para la piel que aún estén pegadas a la herida.Instrucciones generales        Controle la raúl de la herida todos los días para detectar signos de infección. Esté atento a los siguientes signos:  Dolor, hinchazón o enrojecimiento.Líquido o minnie.Calor.Pus o mal olor.[place] los medicamentos de venta daphney y los recetados solamente teresa se lo haya indicado el médico.Si le recetaron un antibiótico, tómelo o aplíqueselo teresa se lo haya indicado el médico. No deje de usar el antibiótico, ni siquiera si el cuadro clínico mejora.Después de que la laceración haya sanado:  Sepa que puede demorar un año o dos para que el enrojecimiento o la cicatrización desaparezcan.Aplique protector solar sobre la piel de byrne herida curada para reducir al mínimo las cicatrices. Los hoang ultravioleta (UV) pueden oscurecer el tejido cicatricial.Comuníquese con un médico si:  Tiene fiebre.Tiene enrojecimiento, hinchazón o dolor alrededor de la herida.Observa líquido o minnie que salen de la herida.La herida está caliente al tacto.Tiene pus o percibe mal olor que emana de la herida.[place] ayuda de inmediato si:  Tiene belgica línea laura que sale de la herida.Resumen  Es posible que necesite tratamiento para belgica laceración facial a fin de evitar infecciones, detener el sangrado, ayudar a la curación y prevenir las cicatrices.Belgica laceración profunda puede cerrarse con puntos (suturas).Siga cuidadosamente las indicaciones del médico sobre el cuidado de la herida.  Mordeduras de animales en adultos  Animal Bite, Adult  Las heridas por mordeduras de animales pueden ser leves o graves. Es importante obtener tratamiento médico para prevenir belgica infección. Pregunte al médico si necesita tratamiento para prevenir belgica infección que pueda contagiarse de animales a seres humanos (sabrina).  Siga estas indicaciones en byrne casa:  Cuidados de la herida        Siga las indicaciones del médico en lo que respecta al cuidado de la herida. [place] lo siguiente:  Lávese las dona con agua y jabón antes de cambiar la venda (vendaje). Use un desinfectante para dona si no dispone de agua y jabón.Cambie el vendaje teresa se lo haya indicado el médico.No retire los puntos (suturas), la goma para cerrar la piel o las tiras adhesivas. Stevo vez deban dejarse puestos en la piel skye 2 semanas o más tiempo. Si las tiras adhesivas se despegan y se enroscan, puede recortar los bordes sueltos. No retire las tiras adhesivas por completo a menos que el médico lo autorice.Controle la herida todos los días para detectar signos de infección. Esté atento a los siguientes signos:  Aumento del enrojecimiento, de la hinchazón o del dolor.Más líquido o minnie.Calor.Pus o mal olor.Medicamentos     [place] o aplique los medicamentos de venta daphney y los recetados solamente teresa se lo haya indicado el médico.Si le recetaron un antibiótico, tómelo o aplíquelo teresa se lo haya indicado el médico. No deje de usar el antibiótico aunque la herida mejore.Instrucciones generales        Cuando esté sentado o acostado, mantenga la raúl de la herida elevada por encima del nivel del corazón.Si se lo indican, aplique hielo sobre la raúl de la lesión.  Ponga el hielo en belgica bolsa plástica.Coloque belgica toalla entre la piel y la bolsa de hielo.Coloque el hielo skye 20 minutos, de 2 a 3 veces por día.Concurra a todas las visitas de control teresa se lo haya indicado el médico. [place] es importante.Comuníquese con un médico si:  Tiene más enrojecimiento, hinchazón o dolor alrededor de la herida.La herida está caliente al tacto.Tiene fiebre o siente escalofríos.Tiene belgica sensación de malestar general.Siente malestar estomacal (náuseas).Vomita.Tiene un dolor que no se ekta.Solicite ayuda de inmediato si:  Tiene belgica línea laura que sale de la herida.Alguna de estas sustancias emana de la herida:  Líquido no negrita.Más minnie.Pus o mal olor.Tiene dificultad para  la raúl de la herida.Pierde la sensibilidad (tiene adormecimiento) o siente hormigueo en cualquier parte del cuerpo.Resumen  Es importante recibir el tratamiento médico adecuado para mordeduras de animales. El tratamiento puede ayudar a evitar que contraiga belgica infección. Pregunte al médico si necesita tratamiento para prevenir belgica infección que pueda contagiarse de animales a seres humanos (sabrina).Controle la herida todos los días para detectar signos de infección, teresa aumento, en lugar de disminución, del enrojecimiento o la hinchazón.Obtenga ayuda de inmediato si tiene belgica línea laura que sale de la herida.Esta información no tiene teresa fin reemplazar el consejo del médico. Asegúrese de hacerle al médico cualquier pregunta que tenga.

## 2020-02-02 NOTE — ED PROVIDER NOTE - CARE PLAN
Principal Discharge DX:	Lip laceration Principal Discharge DX:	Lip laceration, initial encounter  Secondary Diagnosis:	Dog bite, initial encounter

## 2020-02-02 NOTE — ED PROVIDER NOTE - PROGRESS NOTE DETAILS
ARSLAN [place] - laceration repaired by Dr Borrego - pt admitted to her that he was bitten by friend's dog while trying to kiss the animal and didn't really trip and fell;  (+) UTD with rabies series; prophylaxed with Augmentin;

## 2020-02-02 NOTE — ED PROVIDER NOTE - ATTENDING CONTRIBUTION TO CARE
Dr. Ford: I performed a face to face bedside interview with patient regarding history of present illness, review of symptoms and past medical history. I completed an independent physical exam.  I have discussed patient's plan of care with PA.   I agree with note as stated above, having amended the EMR as needed to reflect my findings.   This includes HISTORY OF PRESENT ILLNESS, HIV, PAST MEDICAL/SURGICAL/FAMILY/SOCIAL HISTORY, ALLERGIES AND HOME MEDICATIONS, REVIEW OF SYSTEMS, PHYSICAL EXAM, and any PROGRESS NOTES during the time I functioned as the attending physician for this patient.    see mdm

## 2020-02-02 NOTE — ED ADULT NURSE NOTE - NSSEPSISSUSPECTED_ED_A_ED
FOLLOW UP NOTE    Subjective   Patient ID: Kamaljit is a 76 year old male.    Chief Complaint   Patient presents with   • Follow-up     echocardiogram 1 month, started new med 1 month ago, no side effects       HISTORY OF PRESENT ILLNESS:  Patient comes in for the follow-up  He describes an episode a few days ago while standing at the store at around noontime he suddenly felt lightheaded and felt he may pass out . he denies any palpitations at the time.  He also describes some achiness of the shoulders bilaterally and bilateral arm pains intermittently.  Although he is very active around the house.  And he is not short of breath.  He denies any syncopal episodes.  Since starting the Cardura last month he has felt better however his blood pressure at home has been usually about 130-140 mm systolic.  He denies any headache or visual disturbances.          Current Outpatient Medications   Medication Sig Dispense Refill   • doxazosin (CARDURA) 2 MG tablet Take 2 mg by mouth nightly.     • dilTIAZem (CARDIZEM CD) 180 MG 24 hr capsule Take 180 mg by mouth daily.     • lisinopril (PRINIVIL,ZESTRIL) 40 MG tablet Take 40 mg by mouth daily.       No current facility-administered medications for this visit.      ALLERGIES:   Allergen Reactions   • Levaquin ANAPHYLAXIS     unknown   • Atorvastatin MYALGIA   • Ezetimibe MYALGIA     unknown     Past Medical History:   Diagnosis Date   • Atrial fibrillation (CMS/HCC)    • CAD (coronary artery disease)    • Heart disease    • High cholesterol    • Hyperlipemia    • Hypertension    • MI (mitral incompetence)      Past Surgical History:   Procedure Laterality Date   • Back surgery     • Cardiac catherization  03/06/2012    pLAD 30%, mLAD 40%, dLAD 30%, dCx 90%, OM1 50%, OM2 50%, LPDA 90%, mRI 40%, mRCA 95%   • Cardioversion  03/06/2012    In ER, pt went into Vfib   • Stent implant  03/06/2012    Xience HEIDY   pRCA 2.25x23 and d Cx 2.25x18.     Family History   Problem Relation Age of  Onset   • Heart disease Mother    • Myocardial Infarction Father    • Kidney disease Brother      Social History     Tobacco Use   Smoking Status Never Smoker   Smokeless Tobacco Never Used     Social History     Substance and Sexual Activity   Drug Use No     Social History     Substance and Sexual Activity   Alcohol Use Yes   • Alcohol/week: 0.6 - 1.2 oz   • Types: 1 - 2 Cans of beer per week    Comment: daily       Patient's medications, allergies, past medical, surgical, social and family histories were reviewed and updated as appropriate.    Review of Systems   Constitution: Negative for decreased appetite, fever, malaise/fatigue and weight gain.   Eyes: Negative for vision loss in left eye, vision loss in right eye and visual disturbance.   Cardiovascular: Positive for chest pain, irregular heartbeat, near-syncope and palpitations. Negative for claudication, cyanosis, dyspnea on exertion, leg swelling, orthopnea, paroxysmal nocturnal dyspnea and syncope.   Respiratory: Negative for cough, shortness of breath, sleep disturbances due to breathing, snoring and wheezing.    Endocrine: Negative.    Skin: Negative for flushing, rash and suspicious lesions.   Musculoskeletal: Negative for back pain, joint pain, muscle weakness and myalgias.   Gastrointestinal: Negative for abdominal pain, anorexia, constipation, heartburn and melena.   Genitourinary: Negative for hematuria.   Neurological: Negative for difficulty with concentration, excessive daytime sleepiness, dizziness, headaches, light-headedness, loss of balance, numbness, paresthesias, seizures, sensory change, tremors and vertigo.   Psychiatric/Behavioral: The patient does not have insomnia and is not nervous/anxious.        Objective   Visit Vitals  /76   Pulse 52   Resp 12   Ht 5' 8\" (1.727 m)   Wt 79.7 kg (175 lb 11.3 oz)   BMI 26.72 kg/m²     Physical Exam   Constitutional: He appears healthy. No distress.   HENT:   Mouth/Throat: Dentition is normal.    Eyes: Conjunctivae are normal. Pupils are equal, round, and reactive to light.   Neck: Thyroid normal. Neck supple. No JVD present. No neck adenopathy. No thyromegaly present.   Cardiovascular: Normal rate, regular rhythm, S1 normal, S2 normal, intact distal pulses and normal pulses.  Occasional extrasystoles are present. Exam reveals no S3 and no S4.   Murmur heard.   Systolic murmur is present.  Pulmonary/Chest: Effort normal and breath sounds normal. No stridor. He has no wheezes. He has no rales. He exhibits no tenderness.   Abdominal: Soft. Bowel sounds are normal. He exhibits no distension and no mass. There is no splenomegaly or hepatomegaly. There is no tenderness. No hernia.   Musculoskeletal: He exhibits no edema, tenderness or deformity.   Neurological: He is alert and oriented to person, place, and time.   Skin: Skin is warm. No cyanosis. No jaundice. Nails show no clubbing.       No results found for this or any previous visit.    Assessment   (I35.0) Nonrheumatic aortic valve stenosis  (primary encounter diagnosis)    (I10) Essential hypertension, benign    (I25.10) Coronary artery disease involving native heart, angina presence unspecified, unspecified vessel or lesion type    (R07.2) Precordial pain    (I48.0) Paroxysmal atrial fibrillation (CMS/HCC)    (I49.01) Ventricular fibrillation (CMS/HCC)    I discussed with the patient about his prior coronary artery disease related symptoms.  Which is somewhat similar to his arm pain.  He does have mild to moderate aortic stenosis.  And he does have prior history of cardiac arrhythmias.  He said of the stress test I recommended him to undergo cardiac catheterization for further evaluation of coronary artery disease.  Afterwards he may need 30-day event monitor.  Potential evaluation for aortic stenosis including right and left heart catheterization may be necessary.  Cardiac catheterization and possible PCI and possibility of medical versus surgical  therapy were discussed.  Potential for complications including bleeding infection CVA thrombosis microinfarction or renal failure were explained and discussed.  Patient understands and wished to proceed.  Continue current medications.  He has not been able to tolerate beta-blockers in the past because of the extreme fatigue.  Recommend aspirin 81 mg daily.  Follow up: No Follow-up on file.    Donal Rivas MD     No

## 2020-02-02 NOTE — ED PROVIDER NOTE - PHYSICAL EXAMINATION
dentition intact   no TTP over mandible or maxilla  no malocclusion   no TTP over facial bones   irregular deep laceration to upper lt/mid lip area crossing both vermillion borders   (+) gynecomastia

## 2020-02-02 NOTE — ED ADULT NURSE NOTE - NSIMPLEMENTINTERV_GEN_ALL_ED
Implemented All Fall Risk Interventions:  [place] to call system. Call bell, personal items and telephone within reach. Instruct patient to call for assistance. Room bathroom lighting operational. Non-slip footwear when patient is off stretcher. Physically safe environment: no spills, clutter or unnecessary equipment. Stretcher in lowest position, wheels locked, appropriate side rails in place. Provide visual cue, wrist band, yellow gown, etc. Monitor gait and stability. Monitor for mental status changes and reorient to person, place, and time. Review medications for side effects contributing to fall risk. Reinforce activity limits and safety measures with patient and family.

## 2020-02-02 NOTE — ED PROVIDER NOTE - CLINICAL SUMMARY MEDICAL DECISION MAKING FREE TEXT BOX
Dr. Ford: 39M h/o pre-DM, tdap utd, p/w lip lac. Earlier today pt had a mechanical fall and landed on hip lip, no other injuries. No LOC. On exam pt is well appearing, nad, no C spine ttp, 2cm V shaped deep laceration over left upper crossing vermilion border twice. Rest of exam unremarkable. Dr. Borrego from plastics to repair lac. Dr. Ford: 39M h/o pre-DM, tdap utd, p/w lip lac. Earlier today pt had a mechanical fall and landed on hip lip, no other injuries. No LOC. On exam pt is well appearing, nad, no C spine ttp, 2cm V shaped deep laceration over left upper crossing vermilion border twice. Rest of exam unremarkable. Dr. Borrego from plastics to repair lac.  Upon further questioning pt's lac is from a dog bite. Dog's immunizations UTD.

## 2020-02-02 NOTE — ED PROVIDER NOTE - SEVERITY
PAIN SCALE 5 OF 10. Double O-Z Plasty Text: The defect edges were debeveled with a #15 scalpel blade.  Given the location of the defect, shape of the defect and the proximity to free margins a Double O-Z plasty (double transposition flap) was deemed most appropriate.  Using a sterile surgical marker, the appropriate transposition flaps were drawn incorporating the defect and placing the expected incisions within the relaxed skin tension lines where possible. The area thus outlined was incised deep to adipose tissue with a #15 scalpel blade.  The skin margins were undermined to an appropriate distance in all directions utilizing iris scissors.  Hemostasis was achieved with electrocautery.  The flaps were then transposed into place, one clockwise and the other counterclockwise, and anchored with interrupted buried subcutaneous sutures.

## 2020-02-02 NOTE — ED ADULT NURSE REASSESSMENT NOTE - NS ED NURSE REASSESS COMMENT FT1
Plastics MD Elmo at bedside, Patient now mentioning that laceration was caused by a dog bite (friend's dog). Pt declining tetanus vaccine stating "I just had one 6 months ago" and pt declining rabbies vaccine stating "I know the dog it's not an outside dog". MD Liliana at bedside and aware

## 2020-02-02 NOTE — ED ADULT NURSE NOTE - CHPI ED NUR SYMPTOMS NEG
no rectal pain/no blood in mucus/no drainage/no purulent drainage/no redness/no vomiting/no fever/no chills

## 2020-02-02 NOTE — ED PROVIDER NOTE - PATIENT PORTAL LINK FT
You can access the FollowMyHealth Patient Portal offered by [facility] by registering at the following website: [email or web address]. By joining [employer]’s FollowMyHealth portal, you will also be able to view your health information using other applications (apps) compatible with our system.

## 2020-02-02 NOTE — ED PROVIDER NOTE - CARE PROVIDER_API CALL
Renee Borrego)  Plastic Surgery  [address] [place] [address], [address]  [place] [address]  Phone: [phone]  Fax: [phone]  Follow Up Time:

## 2020-02-02 NOTE — CONSULT NOTE ADULT - SUBJECTIVE AND OBJECTIVE BOX
39 y.o. male who initially reported he fell on street then changed mechanism of injury to dog bite.  Patient states he was new to his friend's [place] and was attempting to kiss dog when he was bitten.  The ER team was made aware of the additional history.  Patient has h/o diabetes, he takes metformin 500 mg daily.      Patient with alcohol on breath, see op note for injury findings

## 2020-02-10 LAB — HBA1C MFR BLD HPLC: 5.8

## 2020-02-11 ENCOUNTER — APPOINTMENT (OUTPATIENT)
Dept: OPHTHALMOLOGY | Facility: CLINIC | Age: 40
End: 2020-02-11

## 2020-02-12 PROBLEM — R73.03 PREDIABETES: Chronic | Status: ACTIVE | Noted: 2020-02-02

## 2020-03-04 ENCOUNTER — APPOINTMENT (OUTPATIENT)
Dept: SURGERY | Facility: HOSPITAL | Age: 40
End: 2020-03-04

## 2020-03-04 ENCOUNTER — OUTPATIENT (OUTPATIENT)
Dept: OUTPATIENT SERVICES | Facility: HOSPITAL | Age: 40
LOS: 1 days | End: 2020-03-04
Payer: SELF-PAY

## 2020-03-04 VITALS
BODY MASS INDEX: 32.14 KG/M2 | HEIGHT: 69 IN | DIASTOLIC BLOOD PRESSURE: 74 MMHG | RESPIRATION RATE: 14 BRPM | SYSTOLIC BLOOD PRESSURE: 118 MMHG | TEMPERATURE: 98.2 F | OXYGEN SATURATION: 99 % | HEART RATE: 79 BPM | WEIGHT: 217 LBS

## 2020-03-04 DIAGNOSIS — Z00.00 ENCOUNTER FOR GENERAL ADULT MEDICAL EXAMINATION WITHOUT ABNORMAL FINDINGS: ICD-10-CM

## 2020-03-04 PROCEDURE — G0463: CPT

## 2020-03-09 DIAGNOSIS — K43.9 VENTRAL HERNIA WITHOUT OBSTRUCTION OR GANGRENE: ICD-10-CM

## 2020-09-18 ENCOUNTER — APPOINTMENT (OUTPATIENT)
Dept: OPHTHALMOLOGY | Facility: CLINIC | Age: 40
End: 2020-09-18

## 2020-11-18 ENCOUNTER — RESULT CHARGE (OUTPATIENT)
Age: 40
End: 2020-11-18

## 2020-11-18 ENCOUNTER — OUTPATIENT (OUTPATIENT)
Dept: OUTPATIENT SERVICES | Facility: HOSPITAL | Age: 40
LOS: 1 days | End: 2020-11-18
Payer: SELF-PAY

## 2020-11-18 ENCOUNTER — APPOINTMENT (OUTPATIENT)
Dept: FAMILY MEDICINE | Facility: HOSPITAL | Age: 40
End: 2020-11-18

## 2020-11-18 VITALS
SYSTOLIC BLOOD PRESSURE: 144 MMHG | TEMPERATURE: 97.7 F | DIASTOLIC BLOOD PRESSURE: 89 MMHG | WEIGHT: 223 LBS | BODY MASS INDEX: 32.93 KG/M2 | HEART RATE: 88 BPM | OXYGEN SATURATION: 98 % | RESPIRATION RATE: 14 BRPM

## 2020-11-18 DIAGNOSIS — T15.92XA FOREIGN BODY ON EXTERNAL EYE, PART UNSPECIFIED, LEFT EYE, INITIAL ENCOUNTER: ICD-10-CM

## 2020-11-18 DIAGNOSIS — Z00.00 ENCOUNTER FOR GENERAL ADULT MEDICAL EXAMINATION W/OUT ABNORMAL FINDINGS: ICD-10-CM

## 2020-11-18 DIAGNOSIS — Z00.00 ENCOUNTER FOR GENERAL ADULT MEDICAL EXAMINATION WITHOUT ABNORMAL FINDINGS: ICD-10-CM

## 2020-11-18 DIAGNOSIS — M54.9 DORSALGIA, UNSPECIFIED: ICD-10-CM

## 2020-11-19 DIAGNOSIS — F41.9 ANXIETY DISORDER, UNSPECIFIED: ICD-10-CM

## 2020-11-19 NOTE — PLAN
[FreeTextEntry1] : \par \par \par \par #Dm2\par -A1c today 6.0\par -Discussed with patient about increasing metformin to 850mg BID however patient was very resistant to this idea and wanted to continue metformin 500mg BID. \par -Ordered Atorvastatin 40mg Qd. Patient mildly resistant with taking it but agreed eventually after our discussion of benefits of taking this medication, \par -Adviced patient to lose 5lbs in the next 3 months\par -Ordered Microalbumin/cr, cmp, lipid panel\par \par #Ciggarrete nicotine dependance\par -Patient refused to engage on the topic of smoking cessation. I asked the patient to take it into consideration for the future\par F/U in 3 months

## 2020-11-19 NOTE — HEALTH RISK ASSESSMENT
[Excellent] : ~his/her~ current health as excellent [Good] : ~his/her~  mood as  good [6-10] : 6-10 [Yes] : Yes [Monthly or less (1 pt)] : Monthly or less (1 point) [1 or 2 (0 pts)] : 1 or 2 (0 points) [Never (0 pts)] : Never (0 points) [No] : In the past 12 months have you used drugs other than those required for medical reasons? No [No falls in past year] : Patient reported no falls in the past year [0] : 2) Feeling down, depressed, or hopeless: Not at all (0) [Alone] : lives alone [Employed] : employed [Less Than High School] : less than high school [Single] : single [Feels Safe at Home] : Feels safe at home [Fully functional (bathing, dressing, toileting, transferring, walking, feeding)] : Fully functional (bathing, dressing, toileting, transferring, walking, feeding) [Fully functional (using the telephone, shopping, preparing meals, housekeeping, doing laundry, using] : Fully functional and needs no help or supervision to perform IADLs (using the telephone, shopping, preparing meals, housekeeping, doing laundry, using transportation, managing medications and managing finances) [Smoke Detector] : smoke detector [Patient/Caregiver not ready to engage] : Patient/Caregiver not ready to engage [Audit-CScore] : 1 [[id number]] : 0 [Change in mental status noted] : No change in mental status noted [Sexually Active] : not sexually active [High Risk Behavior] : no high risk behavior [Reports changes in hearing] : Reports no changes in hearing [Reports changes in vision] : Reports no changes in vision [Reports changes in dental health] : Reports no changes in dental health

## 2020-11-19 NOTE — HEALTH RISK ASSESSMENT
[Excellent] : ~his/her~ current health as excellent [Good] : ~his/her~  mood as  good [6-10] : 6-10 [Yes] : Yes [Monthly or less (1 pt)] : Monthly or less (1 point) [1 or 2 (0 pts)] : 1 or 2 (0 points) [Never (0 pts)] : Never (0 points) [No] : In the past 12 months have you used drugs other than those required for medical reasons? No [No falls in past year] : Patient reported no falls in the past year [0] : 2) Feeling down, depressed, or hopeless: Not at all (0) [Alone] : lives alone [Employed] : employed [Less Than High School] : less than high school [Single] : single [Feels Safe at Home] : Feels safe at home [Fully functional (bathing, dressing, toileting, transferring, walking, feeding)] : Fully functional (bathing, dressing, toileting, transferring, walking, feeding) [Fully functional (using the telephone, shopping, preparing meals, housekeeping, doing laundry, using] : Fully functional and needs no help or supervision to perform IADLs (using the telephone, shopping, preparing meals, housekeeping, doing laundry, using transportation, managing medications and managing finances) [Smoke Detector] : smoke detector [Patient/Caregiver not ready to engage] : Patient/Caregiver not ready to engage [Audit-CScore] : 1 [[id number]] : 0 [Change in mental status noted] : No change in mental status noted [Sexually Active] : not sexually active [High Risk Behavior] : no high risk behavior [Reports changes in hearing] : Reports no changes in hearing [Reports changes in vision] : Reports no changes in vision [Reports changes in dental health] : Reports no changes in dental health

## 2020-11-19 NOTE — PHYSICAL EXAM
[No Acute Distress] : no acute distress [Well Nourished] : well nourished [No Respiratory Distress] : no respiratory distress  [No Accessory Muscle Use] : no accessory muscle use [Clear to Auscultation] : lungs were clear to auscultation bilaterally [PERRL] : pupils equal round and reactive to light [Normal Outer Ear/Nose] : the outer ears and nose were normal in appearance [Normal Oropharynx] : the oropharynx was normal [No JVD] : no jugular venous distention [Normal Rate] : normal rate  [Regular Rhythm] : with a regular rhythm [Normal S1, S2] : normal S1 and S2 [No Carotid Bruits] : no carotid bruits [No Edema] : there was no peripheral edema [Soft] : abdomen soft [Non Tender] : non-tender [Non-distended] : non-distended [Normal Posterior Cervical Nodes] : no posterior cervical lymphadenopathy [Normal Anterior Cervical Nodes] : no anterior cervical lymphadenopathy [No CVA Tenderness] : no CVA  tenderness [No Spinal Tenderness] : no spinal tenderness [No Joint Swelling] : no joint swelling [Grossly Normal Strength/Tone] : grossly normal strength/tone [No Rash] : no rash [Coordination Grossly Intact] : coordination grossly intact [No Focal Deficits] : no focal deficits [Right Foot Was Examined] : Right foot ~C was examined [Left Foot Was Examined] : left foot ~C was examined [None] : no ulcers in either foot were found [] : both feet [TWNoteComboBox3] : +1 [TWNoteComboBox4] : 0

## 2020-11-19 NOTE — REVIEW OF SYSTEMS
[Fever] : no fever [Chills] : no chills [Pain] : no pain [Vision Problems] : no vision problems [Earache] : no earache [Hearing Loss] : no hearing loss [Chest Pain] : no chest pain [Palpitations] : no palpitations [Shortness Of Breath] : no shortness of breath [Wheezing] : no wheezing [Abdominal Pain] : no abdominal pain [Nausea] : no nausea [Dysuria] : no dysuria [Nocturia] : no nocturia [Frequency] : no frequency [Joint Pain] : no joint pain [Joint Stiffness] : no joint stiffness [Back Pain] : no back pain [Itching] : no itching [Skin Rash] : no skin rash [Headache] : no headache [Dizziness] : no dizziness

## 2020-12-08 ENCOUNTER — OUTPATIENT (OUTPATIENT)
Dept: OUTPATIENT SERVICES | Facility: HOSPITAL | Age: 40
LOS: 1 days | End: 2020-12-08

## 2020-12-08 DIAGNOSIS — K76.0 FATTY (CHANGE OF) LIVER, NOT ELSEWHERE CLASSIFIED: ICD-10-CM

## 2020-12-09 PROCEDURE — 80061 LIPID PANEL: CPT

## 2020-12-09 PROCEDURE — G0463: CPT

## 2020-12-09 PROCEDURE — 80053 COMPREHEN METABOLIC PANEL: CPT

## 2020-12-09 PROCEDURE — 82043 UR ALBUMIN QUANTITATIVE: CPT

## 2021-04-16 ENCOUNTER — EMERGENCY (EMERGENCY)
Facility: HOSPITAL | Age: 41
LOS: 1 days | Discharge: HOME | End: 2021-04-16
Attending: EMERGENCY MEDICINE | Admitting: EMERGENCY MEDICINE
Payer: SELF-PAY

## 2021-04-16 VITALS
RESPIRATION RATE: 18 BRPM | TEMPERATURE: 97 F | HEIGHT: 67 IN | DIASTOLIC BLOOD PRESSURE: 88 MMHG | WEIGHT: 225.09 LBS | OXYGEN SATURATION: 97 % | SYSTOLIC BLOOD PRESSURE: 150 MMHG | HEART RATE: 85 BPM

## 2021-04-16 PROCEDURE — 99283 EMERGENCY DEPT VISIT LOW MDM: CPT

## 2021-04-16 PROCEDURE — 99053 MED SERV 10PM-8AM 24 HR FAC: CPT

## 2021-04-16 RX ORDER — NEFAZODONE HYDROCHLORIDE 200 MG/1
1 TABLET ORAL
Qty: 10 | Refills: 0
Start: 2021-04-16 | End: 2021-04-25

## 2021-04-16 NOTE — ED PROVIDER NOTE - NSFOLLOWUPCLINICS_GEN_ALL_ED_FT
[facility]  Family Medicine  [address] [place] [id number]  Phone: [phone]  Fax:

## 2021-04-16 NOTE — ED PROVIDER NOTE - OBJECTIVE STATEMENT
pt with 6 days of insomnia, tried melatonin x1 without much benefit, denies SI/HI. denies depression

## 2021-04-16 NOTE — ED PROVIDER NOTE - NSFOLLOWUPINSTRUCTIONS_ED_ALL_ED_FT
-- Follow up with Family Practice in 48 hours.    -- Return to the ER for worsening or persistent symptoms, and/or ANY NEW OR CONCERNING SYMPTOMS.    -- If you have difficulty following up, please call: [phone] to obtain a [facility] doctor or specialist who takes your insurance in your area.

## 2021-04-16 NOTE — ED PROVIDER NOTE - PATIENT PORTAL LINK FT
You can access the FollowMyHealth Patient Portal offered by [facility] by registering at the following website: [email or web address]. By joining [employer]’s FollowMyHealth portal, you will also be able to view your health information using other applications (apps) compatible with our system.

## 2021-04-20 ENCOUNTER — NON-APPOINTMENT (OUTPATIENT)
Age: 41
End: 2021-04-20

## 2021-04-26 LAB
ALBUMIN SERPL ELPH-MCNC: 4.8 G/DL
ALP BLD-CCNC: 87 U/L
ALT SERPL-CCNC: 82 U/L
ANION GAP SERPL CALC-SCNC: 11 MMOL/L
AST SERPL-CCNC: 41 U/L
BILIRUB SERPL-MCNC: 0.5 MG/DL
BUN SERPL-MCNC: 14 MG/DL
CALCIUM SERPL-MCNC: 9.9 MG/DL
CHLORIDE SERPL-SCNC: 102 MMOL/L
CHOLEST SERPL-MCNC: 203 MG/DL
CO2 SERPL-SCNC: 28 MMOL/L
CREAT SERPL-MCNC: 0.91 MG/DL
CREAT SPEC-SCNC: 132 MG/DL
GLUCOSE SERPL-MCNC: 111 MG/DL
HDLC SERPL-MCNC: 64 MG/DL
LDLC SERPL CALC-MCNC: 124 MG/DL
MICROALBUMIN 24H UR DL<=1MG/L-MCNC: <1.2 MG/DL
MICROALBUMIN/CREAT 24H UR-RTO: NORMAL MG/G
NONHDLC SERPL-MCNC: 139 MG/DL
POTASSIUM SERPL-SCNC: 4.8 MMOL/L
PROT SERPL-MCNC: 7.3 G/DL
SODIUM SERPL-SCNC: 141 MMOL/L
TRIGL SERPL-MCNC: 72 MG/DL

## 2021-04-26 NOTE — HISTORY OF PRESENT ILLNESS
[FreeTextEntry1] : CPE/DM2  [de-identified] : Patient is a 39 y/o overwieght male with a hx of DM2 controlled with metformin.

## 2021-04-26 NOTE — HISTORY OF PRESENT ILLNESS
[FreeTextEntry1] : CPE/DM2  [de-identified] : Patient is a 39 y/o overwieght male with a hx of DM2 controlled with metformin.

## 2021-06-04 NOTE — ED ADULT NURSE NOTE - CHIEF COMPLAINT QUOTE
Pt c/o difficulty sleeping x 6 days. Pt is a 54 yo f who has been seen by cardiology, gi rheumatology primary care neurology: dr Garcia, Dr Viridiana wild has undergone upper and lower endoscopy, cardaic stress testing, autoimmune and infectious disease workup, neurologic evaluation including EMG.  pt states she has not heard back from any of her doctors and is panicked that she is dying.  Pt states today she called 911 because she was feeling panicked, had pins and needles in her hands and feet with chest tightness.    she is not a smoker nor drinker no dv no trauma lives at home alone has been working from home this past year as a  for [employer].  She states her numbnes causes her to have trouble walking but she denies weakness.  no other neuro sx.  on eval  pt has pressured speech, continues to repeat herself to emphasize her symptoms and then repeatedly interrupts me during my explanations and summaries of her statements and reflections back to her. her speech is clear and cogent.  heent nc at mmm perrla eomi no nystagmus she has good eye tracking wears eyeglasses  neck is supple she has paraspial trapzius muscle spasm to palpation  cor rrr no m r g  chest cta, abd soft nt nd no hsm no guard no rebound no cvat  ext normal motor sensory no edema normal pulses  neuro  normal no hypo/hyperreflexia normal tone no weakness normal sensation to light touch  psy pt very anxious no si no hi  plan ro metabolic considerations for pt's sx, ekg xray ct head pt has ha, consult with pt's cardiologist dr garcia including request for bedside consult by group, on review of labs k is low repleat k assess for hypomag and provide a xanax for anxiety.   pt and follow

## 2021-06-11 NOTE — ASSESSMENT
Recommend a visit to evaluate further. Urgent care would be good. Probably shouldn't wait until Monday.   [FreeTextEntry1] : #Anxiety/Panic attacks\par - Symptoms likely related to anxiety given Cardiology work-up is within normal limits\par - Start Paroxetine 20mg daily; counseled on GI and Sexual side-effects\par - Counseled on CBT\par \par FUV in 2-4 weeks for re-evaluation of symptoms

## 2021-08-25 ENCOUNTER — APPOINTMENT (OUTPATIENT)
Dept: FAMILY MEDICINE | Facility: HOSPITAL | Age: 41
End: 2021-08-25

## 2021-10-20 ENCOUNTER — APPOINTMENT (OUTPATIENT)
Dept: FAMILY MEDICINE | Facility: HOSPITAL | Age: 41
End: 2021-10-20

## 2021-10-20 ENCOUNTER — RESULT CHARGE (OUTPATIENT)
Age: 41
End: 2021-10-20

## 2021-10-20 ENCOUNTER — OUTPATIENT (OUTPATIENT)
Dept: OUTPATIENT SERVICES | Facility: HOSPITAL | Age: 41
LOS: 1 days | End: 2021-10-20
Payer: SELF-PAY

## 2021-10-20 VITALS
OXYGEN SATURATION: 98 % | SYSTOLIC BLOOD PRESSURE: 110 MMHG | TEMPERATURE: 96.8 F | WEIGHT: 220 LBS | BODY MASS INDEX: 32.49 KG/M2 | DIASTOLIC BLOOD PRESSURE: 75 MMHG | HEART RATE: 98 BPM | RESPIRATION RATE: 14 BRPM

## 2021-10-20 DIAGNOSIS — Z00.00 ENCOUNTER FOR GENERAL ADULT MEDICAL EXAMINATION WITHOUT ABNORMAL FINDINGS: ICD-10-CM

## 2021-10-20 DIAGNOSIS — Z86.59 PERSONAL HISTORY OF OTHER MENTAL AND BEHAVIORAL DISORDERS: ICD-10-CM

## 2021-10-20 PROCEDURE — 80053 COMPREHEN METABOLIC PANEL: CPT

## 2021-10-20 PROCEDURE — G0463: CPT

## 2021-10-20 PROCEDURE — 82043 UR ALBUMIN QUANTITATIVE: CPT

## 2021-10-20 PROCEDURE — 80061 LIPID PANEL: CPT

## 2021-10-20 RX ORDER — ATORVASTATIN CALCIUM 40 MG/1
40 TABLET, FILM COATED ORAL
Qty: 1 | Refills: 0 | Status: DISCONTINUED | COMMUNITY
Start: 2020-11-18 | End: 2021-10-20

## 2021-10-21 DIAGNOSIS — K76.0 FATTY (CHANGE OF) LIVER, NOT ELSEWHERE CLASSIFIED: ICD-10-CM

## 2021-10-21 DIAGNOSIS — E11.9 TYPE 2 DIABETES MELLITUS WITHOUT COMPLICATIONS: ICD-10-CM

## 2021-10-21 DIAGNOSIS — F17.210 NICOTINE DEPENDENCE, CIGARETTES, UNCOMPLICATED: ICD-10-CM

## 2021-10-21 DIAGNOSIS — Z23 ENCOUNTER FOR IMMUNIZATION: ICD-10-CM

## 2021-10-21 DIAGNOSIS — E66.9 OBESITY, UNSPECIFIED: ICD-10-CM

## 2021-10-22 NOTE — HISTORY OF PRESENT ILLNESS
[FreeTextEntry1] : Diabetes Follow up [de-identified] : Patient is a 42 y/o overweight male with a hx of DM2 controlled with diet who presents for DM2 follow up.  Patient reports feeling well. He reports smoking 5 cigarettes daily for the past 15 years. Currently patient denies the following; polyuria or polydipsia, hypoglycemic episodes, weight gain/loss, peripheral extremity tingling/pain. Patient reports mild right eye dryness but denies pain or purulent drainage. Patient reports poor compliance with his metformin 500mg BID and atorvastatin has not taken medication in months. Patient  doesn't check his BG levels. Last eye exam was never.\par

## 2021-10-22 NOTE — REVIEW OF SYSTEMS
[Redness] : redness [Dryness] : dryness [Fever] : no fever [Chills] : no chills [Discharge] : no discharge [Pain] : no pain [Vision Problems] : no vision problems [Itching] : no itching [Earache] : no earache [Hearing Loss] : no hearing loss [Chest Pain] : no chest pain [Palpitations] : no palpitations [Shortness Of Breath] : no shortness of breath [Wheezing] : no wheezing [Abdominal Pain] : no abdominal pain [Nausea] : no nausea [Dysuria] : no dysuria [Nocturia] : no nocturia [Frequency] : no frequency [Joint Pain] : no joint pain [Joint Stiffness] : no joint stiffness [Back Pain] : no back pain [Itching] : no itching [Skin Rash] : no skin rash [Headache] : no headache [Dizziness] : no dizziness [FreeTextEntry3] : Right EYe

## 2021-10-22 NOTE — PLAN
[FreeTextEntry1] : \par \par \par \par #Dm2\par -A1c today  6.9 from 6\par -Discussed with patient about  restarting metformin 500mg BID and increasing gradually\par -reOrdered Atorvastatin 40mg Qd. Patient mildly resistant with taking it but agreed eventually after our discussion of benefits of taking this medication. \par -Ordered Microalbumin/cr, cmp, lipid panel \par -Ordered optho referral\par \par #Cigarette nicotine dependance\par -Patient refused to stop smoking cessation at this time but is considering to stop next visit. I asked the patient to take it into consideration for the future. \par \par #NAFLD\par -Repeat LIpid Profile\par -Denies ETOH\par \par #Health\par -Flu and covid vaccine Refused\par \par F/U in 1 month  for CPE

## 2021-10-22 NOTE — PHYSICAL EXAM
[No Acute Distress] : no acute distress [Well Nourished] : well nourished [PERRL] : pupils equal round and reactive to light [Normal Outer Ear/Nose] : the outer ears and nose were normal in appearance [Normal Oropharynx] : the oropharynx was normal [No JVD] : no jugular venous distention [No Respiratory Distress] : no respiratory distress  [No Accessory Muscle Use] : no accessory muscle use [Clear to Auscultation] : lungs were clear to auscultation bilaterally [Normal Rate] : normal rate  [Regular Rhythm] : with a regular rhythm [Normal S1, S2] : normal S1 and S2 [No Carotid Bruits] : no carotid bruits [No Edema] : there was no peripheral edema [Soft] : abdomen soft [Non Tender] : non-tender [Non-distended] : non-distended [Normal Posterior Cervical Nodes] : no posterior cervical lymphadenopathy [Normal Anterior Cervical Nodes] : no anterior cervical lymphadenopathy [No CVA Tenderness] : no CVA  tenderness [No Spinal Tenderness] : no spinal tenderness [No Joint Swelling] : no joint swelling [Grossly Normal Strength/Tone] : grossly normal strength/tone [No Rash] : no rash [Coordination Grossly Intact] : coordination grossly intact [No Focal Deficits] : no focal deficits [Right Foot Was Examined] : Right foot ~C was examined [Left Foot Was Examined] : left foot ~C was examined [None] : no ulcers in either foot were found [] : both feet [TWNoteComboBox3] : +1 [TWNoteComboBox4] : 0

## 2021-11-17 ENCOUNTER — APPOINTMENT (OUTPATIENT)
Dept: FAMILY MEDICINE | Facility: HOSPITAL | Age: 41
End: 2021-11-17

## 2021-11-17 ENCOUNTER — OUTPATIENT (OUTPATIENT)
Dept: OUTPATIENT SERVICES | Facility: HOSPITAL | Age: 41
LOS: 1 days | End: 2021-11-17
Payer: SELF-PAY

## 2021-11-17 VITALS
RESPIRATION RATE: 14 BRPM | DIASTOLIC BLOOD PRESSURE: 73 MMHG | BODY MASS INDEX: 31.75 KG/M2 | SYSTOLIC BLOOD PRESSURE: 111 MMHG | HEART RATE: 71 BPM | WEIGHT: 215 LBS | OXYGEN SATURATION: 98 % | TEMPERATURE: 97 F

## 2021-11-17 DIAGNOSIS — F17.210 NICOTINE DEPENDENCE, CIGARETTES, UNCOMPLICATED: ICD-10-CM

## 2021-11-17 DIAGNOSIS — Z00.00 ENCOUNTER FOR GENERAL ADULT MEDICAL EXAMINATION WITHOUT ABNORMAL FINDINGS: ICD-10-CM

## 2021-11-17 DIAGNOSIS — K43.9 VENTRAL HERNIA W/OUT OBSTRUCTION OR GANGRENE: ICD-10-CM

## 2021-11-17 LAB
ALBUMIN SERPL ELPH-MCNC: 4.6 G/DL
ALP BLD-CCNC: 149 U/L
ALT SERPL-CCNC: 174 U/L
ANION GAP SERPL CALC-SCNC: 12 MMOL/L
AST SERPL-CCNC: 99 U/L
BILIRUB SERPL-MCNC: 0.4 MG/DL
BUN SERPL-MCNC: 15 MG/DL
CALCIUM SERPL-MCNC: 9.9 MG/DL
CHLORIDE SERPL-SCNC: 104 MMOL/L
CHOLEST SERPL-MCNC: 191 MG/DL
CO2 SERPL-SCNC: 24 MMOL/L
CREAT SERPL-MCNC: 0.66 MG/DL
CREAT SPEC-SCNC: 31 MG/DL
GLUCOSE SERPL-MCNC: 142 MG/DL
HBA1C MFR BLD HPLC: 6.9
HDLC SERPL-MCNC: 67 MG/DL
LDLC SERPL CALC-MCNC: 105 MG/DL
MICROALBUMIN 24H UR DL<=1MG/L-MCNC: <1.2 MG/DL
MICROALBUMIN/CREAT 24H UR-RTO: NORMAL MG/G
NONHDLC SERPL-MCNC: 123 MG/DL
POTASSIUM SERPL-SCNC: 4.5 MMOL/L
PROT SERPL-MCNC: 7 G/DL
SODIUM SERPL-SCNC: 140 MMOL/L
TRIGL SERPL-MCNC: 89 MG/DL

## 2021-11-17 PROCEDURE — 86708 HEPATITIS A ANTIBODY: CPT

## 2021-11-17 PROCEDURE — 86803 HEPATITIS C AB TEST: CPT

## 2021-11-17 PROCEDURE — 85025 COMPLETE CBC W/AUTO DIFF WBC: CPT

## 2021-11-17 PROCEDURE — G0463: CPT

## 2021-11-17 PROCEDURE — 86706 HEP B SURFACE ANTIBODY: CPT

## 2021-11-17 PROCEDURE — 36415 COLL VENOUS BLD VENIPUNCTURE: CPT

## 2021-11-17 PROCEDURE — 80053 COMPREHEN METABOLIC PANEL: CPT

## 2021-11-18 DIAGNOSIS — K76.0 FATTY (CHANGE OF) LIVER, NOT ELSEWHERE CLASSIFIED: ICD-10-CM

## 2021-11-18 RX ORDER — IBUPROFEN 200 MG
0.05 TABLET ORAL TWICE DAILY
Qty: 1 | Refills: 0 | Status: ACTIVE | COMMUNITY
Start: 2021-11-18 | End: 1900-01-01

## 2021-11-18 NOTE — PLAN
[FreeTextEntry1] : \par \par #Dm2\par -A1c today  6 from 6.9\par -Discussed with patient about  restarting metformin 500mg BID and increasing gradually\par -ReOrdered Atorvastatin 40mg Qd. Patient mildly resistant with taking it but agreed eventually after our discussion of benefits of taking this medication. \par -Microalbumin/cr unremarkable \par -Ordered optho referral\par \par #Cigarette nicotine dependance\par -Patient refused to stop smoking cessation at this time but is considering to stop next visit. I asked the patient to take it into consideration for the future. \par \par #Elevated Transaminases\par DDx: NAFLD vs hepatitis vs etoh(likely) \par #Ventral/insicional hernia\par -Lipid  \par -Ordered Hepatitis Panel\par -Completed hepatitis b series 2014\par -ASt 99 from 42\par - from 83\par -Alk phos 149 from 88\par -Fatty infiltration on Abdominal US in 2016\par -No abdominal hernia seen in CT Abdomen in 2014\par -Ordered Abdominal CT scan\par -Ordered Surgery consult for eval\par \par #Health\par -Flu and covid vaccine Refused\par \par

## 2021-11-18 NOTE — PHYSICAL EXAM
[No Acute Distress] : no acute distress [Well Nourished] : well nourished [PERRL] : pupils equal round and reactive to light [Normal Outer Ear/Nose] : the outer ears and nose were normal in appearance [Normal Oropharynx] : the oropharynx was normal [No JVD] : no jugular venous distention [No Respiratory Distress] : no respiratory distress  [No Accessory Muscle Use] : no accessory muscle use [Clear to Auscultation] : lungs were clear to auscultation bilaterally [Normal Rate] : normal rate  [Regular Rhythm] : with a regular rhythm [Normal S1, S2] : normal S1 and S2 [No Carotid Bruits] : no carotid bruits [No Edema] : there was no peripheral edema [Soft] : abdomen soft [Non Tender] : non-tender [Non-distended] : non-distended [Normal Posterior Cervical Nodes] : no posterior cervical lymphadenopathy [Normal Anterior Cervical Nodes] : no anterior cervical lymphadenopathy [No CVA Tenderness] : no CVA  tenderness [No Spinal Tenderness] : no spinal tenderness [No Joint Swelling] : no joint swelling [Grossly Normal Strength/Tone] : grossly normal strength/tone [No Rash] : no rash [Coordination Grossly Intact] : coordination grossly intact [No Focal Deficits] : no focal deficits [Right Foot Was Examined] : Right foot ~C was examined [Left Foot Was Examined] : left foot ~C was examined [None] : no ulcers in either foot were found [] : both feet [Normal Bowel Sounds] : normal bowel sounds [de-identified] : Well healed midline vertical insicional scar(2001) with a 3cmx 3cm bulge along the right side of incisional scar. Able to reduce bulge.  [TWNoteComboBox3] : +1 [TWNoteComboBox4] : 0

## 2021-11-18 NOTE — REVIEW OF SYSTEMS
[Redness] : redness [Dryness] : dryness [Fever] : no fever [Chills] : no chills [Discharge] : no discharge [Pain] : no pain [Vision Problems] : no vision problems [Itching] : no itching [Earache] : no earache [Hearing Loss] : no hearing loss [Chest Pain] : no chest pain [Palpitations] : no palpitations [Shortness Of Breath] : no shortness of breath [Wheezing] : no wheezing [Abdominal Pain] : no abdominal pain [Nausea] : no nausea [Constipation] : no constipation [Dysuria] : no dysuria [Nocturia] : no nocturia [Frequency] : no frequency [Joint Pain] : no joint pain [Joint Stiffness] : no joint stiffness [Back Pain] : no back pain [Itching] : no itching [Skin Rash] : no skin rash [Headache] : no headache [Dizziness] : no dizziness [FreeTextEntry3] : Right EYe

## 2021-11-18 NOTE — HISTORY OF PRESENT ILLNESS
[FreeTextEntry1] : CPE [de-identified] : Patient is a 42 y/o overweight male with a hx of DM2 controlled with diet who presents for CPE.  Patient reports feeling well since he stopped his heavy ETOH use 1 month ago. Denied withdrawal symptoms.Reports drinking beers 6-8 daily from age  36.   \par   \par \par He reports smoking 5 cigarettes daily for the past 15 years. Currently patient denies the following; polyuria or polydipsia, hypoglycemic episodes, weight gain/loss, peripheral extremity tingling/pain.Patient reports poor compliance with his metformin 500mg BID and atorvastatin has not taken medication in months. Patient reports increase bulge along his incisional scar(S/P open abdominal surgery in 2001 from traumatic injury).  Patient lifts heavy packages at work. Denies pain, constipation, N/V. Reports he is able to reduce bulge mannually.\par \par

## 2021-11-30 ENCOUNTER — OUTPATIENT (OUTPATIENT)
Dept: OUTPATIENT SERVICES | Facility: HOSPITAL | Age: 41
LOS: 1 days | End: 2021-11-30
Payer: SELF-PAY

## 2021-11-30 ENCOUNTER — RESULT REVIEW (OUTPATIENT)
Age: 41
End: 2021-11-30

## 2021-11-30 DIAGNOSIS — K43.9 VENTRAL HERNIA WITHOUT OBSTRUCTION OR GANGRENE: ICD-10-CM

## 2021-11-30 DIAGNOSIS — R74.01 ELEVATION OF LEVELS OF LIVER TRANSAMINASE LEVELS: ICD-10-CM

## 2021-11-30 PROCEDURE — 74176 CT ABD & PELVIS W/O CONTRAST: CPT | Mod: 26,MH

## 2021-11-30 PROCEDURE — 74176 CT ABD & PELVIS W/O CONTRAST: CPT | Mod: MH

## 2022-02-25 LAB
ALBUMIN SERPL ELPH-MCNC: 4.3 G/DL
ALP BLD-CCNC: 161 U/L
ALT SERPL-CCNC: 66 U/L
ANION GAP SERPL CALC-SCNC: 11 MMOL/L
AST SERPL-CCNC: 32 U/L
BASOPHILS # BLD AUTO: 0.01 K/UL
BASOPHILS NFR BLD AUTO: 0.2 %
BILIRUB SERPL-MCNC: 0.2 MG/DL
BUN SERPL-MCNC: 15 MG/DL
CALCIUM SERPL-MCNC: 9.5 MG/DL
CHLORIDE SERPL-SCNC: 106 MMOL/L
CO2 SERPL-SCNC: 23 MMOL/L
CREAT SERPL-MCNC: 0.68 MG/DL
EOSINOPHIL # BLD AUTO: 0.09 K/UL
EOSINOPHIL NFR BLD AUTO: 1.7 %
GLUCOSE SERPL-MCNC: 130 MG/DL
HBV SURFACE AB SER QL: NONREACTIVE
HCT VFR BLD CALC: 41.8 %
HCV AB SER QL: NONREACTIVE
HCV S/CO RATIO: 0.08 S/CO
HEPATITIS A IGG ANTIBODY: REACTIVE
HGB BLD-MCNC: 13.4 G/DL
IMM GRANULOCYTES NFR BLD AUTO: 0.2 %
LYMPHOCYTES # BLD AUTO: 1.87 K/UL
LYMPHOCYTES NFR BLD AUTO: 35.8 %
MAN DIFF?: NORMAL
MCHC RBC-ENTMCNC: 29.6 PG
MCHC RBC-ENTMCNC: 32.1 GM/DL
MCV RBC AUTO: 92.5 FL
MONOCYTES # BLD AUTO: 0.59 K/UL
MONOCYTES NFR BLD AUTO: 11.3 %
NEUTROPHILS # BLD AUTO: 2.65 K/UL
NEUTROPHILS NFR BLD AUTO: 50.8 %
PLATELET # BLD AUTO: 245 K/UL
POTASSIUM SERPL-SCNC: 4.3 MMOL/L
PROT SERPL-MCNC: 6.7 G/DL
RBC # BLD: 4.52 M/UL
RBC # FLD: 12.1 %
SODIUM SERPL-SCNC: 140 MMOL/L
WBC # FLD AUTO: 5.22 K/UL

## 2022-05-28 ENCOUNTER — EMERGENCY (EMERGENCY)
Facility: HOSPITAL | Age: 42
LOS: 1 days | Discharge: HOME | End: 2022-05-28
Attending: EMERGENCY MEDICINE | Admitting: EMERGENCY MEDICINE
Payer: MEDICAID

## 2022-05-28 VITALS
OXYGEN SATURATION: 97 % | RESPIRATION RATE: 15 BRPM | TEMPERATURE: 98 F | DIASTOLIC BLOOD PRESSURE: 82 MMHG | HEART RATE: 97 BPM | SYSTOLIC BLOOD PRESSURE: 145 MMHG

## 2022-05-28 VITALS
HEIGHT: 67 IN | RESPIRATION RATE: 17 BRPM | HEART RATE: 100 BPM | DIASTOLIC BLOOD PRESSURE: 100 MMHG | OXYGEN SATURATION: 96 % | TEMPERATURE: 98 F | WEIGHT: 235.01 LBS | SYSTOLIC BLOOD PRESSURE: 150 MMHG

## 2022-05-28 LAB
ALBUMIN SERPL ELPH-MCNC: 4.2 G/DL — SIGNIFICANT CHANGE UP (ref 3.3–5)
ALP SERPL-CCNC: 144 U/L — HIGH (ref 30–120)
ALT FLD-CCNC: 625 U/L DA — HIGH (ref 10–60)
AMPHET UR-MCNC: NEGATIVE — SIGNIFICANT CHANGE UP
ANION GAP SERPL CALC-SCNC: 14 MMOL/L — SIGNIFICANT CHANGE UP (ref 5–17)
AST SERPL-CCNC: 454 U/L — HIGH (ref 10–40)
BARBITURATES UR SCN-MCNC: NEGATIVE — SIGNIFICANT CHANGE UP
BASOPHILS # BLD AUTO: 0.04 K/UL — SIGNIFICANT CHANGE UP (ref 0–0.2)
BASOPHILS NFR BLD AUTO: 0.8 % — SIGNIFICANT CHANGE UP (ref 0–2)
BENZODIAZ UR-MCNC: NEGATIVE — SIGNIFICANT CHANGE UP
BILIRUB SERPL-MCNC: 1.2 MG/DL — SIGNIFICANT CHANGE UP (ref 0.2–1.2)
BUN SERPL-MCNC: 8 MG/DL — SIGNIFICANT CHANGE UP (ref 7–23)
CALCIUM SERPL-MCNC: 9.2 MG/DL — SIGNIFICANT CHANGE UP (ref 8.4–10.5)
CHLORIDE SERPL-SCNC: 93 MMOL/L — LOW (ref 96–108)
CO2 SERPL-SCNC: 24 MMOL/L — SIGNIFICANT CHANGE UP (ref 22–31)
COCAINE METAB.OTHER UR-MCNC: NEGATIVE — SIGNIFICANT CHANGE UP
CREAT SERPL-MCNC: 0.89 MG/DL — SIGNIFICANT CHANGE UP (ref 0.5–1.3)
EGFR: 110 ML/MIN/1.73M2 — SIGNIFICANT CHANGE UP
EOSINOPHIL # BLD AUTO: 0.01 K/UL — SIGNIFICANT CHANGE UP (ref 0–0.5)
EOSINOPHIL NFR BLD AUTO: 0.2 % — SIGNIFICANT CHANGE UP (ref 0–6)
ETHANOL SERPL-MCNC: 324 MG/DL — HIGH (ref 0–3)
GLUCOSE SERPL-MCNC: 280 MG/DL — HIGH (ref 70–99)
HCT VFR BLD CALC: 44.1 % — SIGNIFICANT CHANGE UP (ref 39–50)
HGB BLD-MCNC: 15 G/DL — SIGNIFICANT CHANGE UP (ref 13–17)
IMM GRANULOCYTES NFR BLD AUTO: 0.4 % — SIGNIFICANT CHANGE UP (ref 0–1.5)
LYMPHOCYTES # BLD AUTO: 1.27 K/UL — SIGNIFICANT CHANGE UP (ref 1–3.3)
LYMPHOCYTES # BLD AUTO: 24.4 % — SIGNIFICANT CHANGE UP (ref 13–44)
MCHC RBC-ENTMCNC: 29.5 PG — SIGNIFICANT CHANGE UP (ref 27–34)
MCHC RBC-ENTMCNC: 34 GM/DL — SIGNIFICANT CHANGE UP (ref 32–36)
MCV RBC AUTO: 86.8 FL — SIGNIFICANT CHANGE UP (ref 80–100)
METHADONE UR-MCNC: NEGATIVE — SIGNIFICANT CHANGE UP
MONOCYTES # BLD AUTO: 0.48 K/UL — SIGNIFICANT CHANGE UP (ref 0–0.9)
MONOCYTES NFR BLD AUTO: 9.2 % — SIGNIFICANT CHANGE UP (ref 2–14)
NEUTROPHILS # BLD AUTO: 3.38 K/UL — SIGNIFICANT CHANGE UP (ref 1.8–7.4)
NEUTROPHILS NFR BLD AUTO: 65 % — SIGNIFICANT CHANGE UP (ref 43–77)
NRBC # BLD: 0 /100 WBCS — SIGNIFICANT CHANGE UP (ref 0–0)
OPIATES UR-MCNC: NEGATIVE — SIGNIFICANT CHANGE UP
PCP SPEC-MCNC: SIGNIFICANT CHANGE UP
PCP UR-MCNC: NEGATIVE — SIGNIFICANT CHANGE UP
PLATELET # BLD AUTO: 180 K/UL — SIGNIFICANT CHANGE UP (ref 150–400)
POTASSIUM SERPL-MCNC: 4.1 MMOL/L — SIGNIFICANT CHANGE UP (ref 3.5–5.3)
POTASSIUM SERPL-SCNC: 4.1 MMOL/L — SIGNIFICANT CHANGE UP (ref 3.5–5.3)
PROT SERPL-MCNC: 8.4 G/DL — HIGH (ref 6–8.3)
RBC # BLD: 5.08 M/UL — SIGNIFICANT CHANGE UP (ref 4.2–5.8)
RBC # FLD: 13.1 % — SIGNIFICANT CHANGE UP (ref 10.3–14.5)
SARS-COV-2 RNA SPEC QL NAA+PROBE: SIGNIFICANT CHANGE UP
SODIUM SERPL-SCNC: 131 MMOL/L — LOW (ref 135–145)
THC UR QL: NEGATIVE — SIGNIFICANT CHANGE UP
TROPONIN I, HIGH SENSITIVITY RESULT: 7.7 NG/L — SIGNIFICANT CHANGE UP
WBC # BLD: 5.2 K/UL — SIGNIFICANT CHANGE UP (ref 3.8–10.5)
WBC # FLD AUTO: 5.2 K/UL — SIGNIFICANT CHANGE UP (ref 3.8–10.5)

## 2022-05-28 PROCEDURE — 99285 EMERGENCY DEPT VISIT HI MDM: CPT | Mod: 25

## 2022-05-28 PROCEDURE — 80053 COMPREHEN METABOLIC PANEL: CPT

## 2022-05-28 PROCEDURE — 80307 DRUG TEST PRSMV CHEM ANLYZR: CPT

## 2022-05-28 PROCEDURE — 36415 COLL VENOUS BLD VENIPUNCTURE: CPT

## 2022-05-28 PROCEDURE — 93005 ELECTROCARDIOGRAM TRACING: CPT

## 2022-05-28 PROCEDURE — 93010 ELECTROCARDIOGRAM REPORT: CPT

## 2022-05-28 PROCEDURE — 71045 X-RAY EXAM CHEST 1 VIEW: CPT

## 2022-05-28 PROCEDURE — 85025 COMPLETE CBC W/AUTO DIFF WBC: CPT

## 2022-05-28 PROCEDURE — 87635 SARS-COV-2 COVID-19 AMP PRB: CPT

## 2022-05-28 PROCEDURE — 71045 X-RAY EXAM CHEST 1 VIEW: CPT | Mod: 26

## 2022-05-28 PROCEDURE — 99285 EMERGENCY DEPT VISIT HI MDM: CPT

## 2022-05-28 PROCEDURE — 84484 ASSAY OF TROPONIN QUANT: CPT

## 2022-05-28 RX ORDER — METFORMIN HYDROCHLORIDE 850 MG/1
0 TABLET ORAL
Qty: 0 | Refills: 0 | DISCHARGE

## 2022-05-28 NOTE — ED PROVIDER NOTE - CARE PROVIDERS DIRECT ADDRESSES
,[email or web address],DirectAddress_Unknown ,[email or web address],DirectAddress_Unknown,DirectAddress_Unknown

## 2022-05-28 NOTE — ED PROVIDER NOTE - OBJECTIVE STATEMENT
42 year old male with history of alcohol abuse, pre-diabetes, and anxiety presents with chest pressure and increased anxiety after stopping drinking. reports alcohol abuse past 22 years and daily drinker. last drank 2 hours ago. reports chest pressure and anxiety after he stops drinking. chest pressure and anxiety started about 2 hours ago after his last drink. reports drinking 6 pack of alcohol this am. denies HI or SI. wants help to stop drinking. never been to detox or rehab. denies other drug use. denies history of withdrawal seizures   PCP "forgot name" 42 year old male with history of alcohol abuse, pre-diabetes, and anxiety presents with chest pressure and increased anxiety after stopping drinking. reports alcohol abuse past 22 years and daily drinker. last drank 2 hours ago. reports chest pressure and anxiety after he stops drinking. chest pressure and anxiety started about 2 hours ago after his last drink. reports drinking 6 pack of alcohol this am. denies HI or SI. wants help to stop drinking. never been to detox or rehab. denies other drug use.   PCP "forgot name" 42 year old male with history of alcohol abuse, pre-diabetes, and anxiety presents with chest pressure and increased anxiety after stopping drinking. reports alcohol abuse past 22 years and daily drinker. last drank 2 hours ago. reports chest pressure and anxiety after he stops drinking. chest pressure and anxiety started about 2 hours ago after his last drink (reports on and off chest pressure for many years, pressure same quality and character as previous chest pressure) . reports drinking 6 pack of alcohol this am. denies HI or SI. wants help to stop drinking. never been to detox or rehab. denies other drug use.   PCP "forgot name"

## 2022-05-28 NOTE — ED PROVIDER NOTE - NS ED ATTENDING STATEMENT MOD
This was a shared visit with the SAM. I reviewed and verified the documentation and independently performed the documented:

## 2022-05-28 NOTE — ED ADULT TRIAGE NOTE - BSA (M2)
2.17 [As Noted in HPI] : as noted in HPI [Earache] : no earache [Loss Of Hearing] : no hearing loss [Sore Throat] : no sore throat [Hoarseness] : no hoarseness [Negative] : Heme/Lymph

## 2022-05-28 NOTE — ED PROVIDER NOTE - NEUROLOGICAL, MLM
Alert and oriented, no focal deficits, no motor or sensory deficits. no tremors on exam. symmetric eyebrow raise and smile. elevates tongue and shoulder without difficulty

## 2022-05-28 NOTE — ED ADULT NURSE NOTE - OBJECTIVE STATEMENT
43yo female walked into ED, pt c/o "I have chest pain, im also an alcoholic". pt denies withdrawals and last alcoholic drink was "a six pack, this morning".

## 2022-05-28 NOTE — ED PROVIDER NOTE - NSFOLLOWUPINSTRUCTIONS_ED_ALL_ED_FT
follow up with primary care provider and cardiology, referrals provided if needed   resources for alcohol detox programs provided       Trastorno por consumo de bebidas alcohólicas    Alcohol Use Disorder      El trastorno por consumo de bebidas alcohólicas es belgica afección en la que el consumo de alcohol altera la lisbeth cotidiana. Las personas con esta afección consumen bebidas alcohólicas en exceso y no pueden controlar byrne consumo.    El trastorno por consumo de bebidas alcohólicas puede causar problemas graves en la lori física. Puede afectar al cerebro, al corazón y a otros órganos internos. Tonya trastorno puede aumentar el riesgo de padecer ciertos tipos de cáncer y causar problemas de lori mental, teresa depresión o ansiedad.      ¿Cuáles son las causas?    Esta afección se debe al consumo excesivo de alcohol con el transcurso del tiempo. Algunas personas que sufren esta afección beben para enfrentarse a situaciones negativas de la lisbeth o escapar de ellas. Otros beben para aliviar el dolor o los síntomas de belgica enfermedad mental.      ¿Qué incrementa el riesgo?    Es más probable que sufra esta afección si:  •Tiene antecedentes familiares de trastorno por consumo de bebidas alcohólicas.      •Byrne cultura fomenta el consumo de bebidas alcohólicas hasta el punto de emborracharse (intoxicación alcohólica).      •Tuvo un trastorno emocional o de conducta en la infancia.      •Ha sufrido abusos.    •Es adolescente y:  •Tiene un desempeño deficiente en la escuela.      •Recibe poca supervisión o guía.      •Actúa de forma impulsiva y le gusta kristie riesgos.          ¿Cuáles son los signos o síntomas?    Los síntomas de esta afección incluyen:  •Beber más de lo que desea.      •Intentar beber menos en varias ocasiones, sin éxito.      •Pasar mucho tiempo pensando en el alcohol, intentando conseguir alcohol, bebiendo o recuperándose de edison bebido.      •Continuar bebiendo incluso cuando esto le causa problemas graves en byrne lisbeth cotidiana.      •Beber cuando es peligroso hacerlo, por ejemplo, antes de conducir.      •Necesitar cada vez más alcohol para obtener el mismo efecto que busca (generar tolerancia).    •Tener síntomas de abstinencia al dejar de beber. Entre los síntomas de abstinencia se incluyen los siguientes:  •Dificultad para dormir, que produce cansancio (fatiga).      •Cambios en el estado de ánimo, con depresión y ansiedad.      •Síntomas físicos, teresa frecuencia cardíaca acelerada, respiración rápida, presión arterial elevada (hipertensión), fiebre, sudoración fría o náuseas.      •Convulsiones.      •Confusión grave.      •Gwen o sentir cosas que no existen (alucinaciones).      •Temblores que no se pueden controlar.          ¿Cómo se diagnostica?    Tonya trastorno se diagnostica mediante belgica evaluación. Para comenzar, el médico puede hacerle henrique o cuatro preguntas sobre byrne consumo de alcohol o entregarle belgica prueba sencilla que deberá realizar. [place] ayudará a obtener información nicholas sobre usted.    Pueden hacerle un examen físico o análisis. Pueden derivarlo a un terapeuta especializado en abuso de sustancias.      ¿Cómo se trata?     Con la educación, algunas personas con trastorno por consumo de bebidas alcohólicas pueden reducir byrne consumo. Muchas personas con tonya trastorno no son capaces de modificar byrne comportamiento por byrne cuenta y necesitan la ayuda de especialistas en abuso de sustancias. Estos especialistas son terapeutas que pueden ayudar a diagnosticar la gravedad de byrne trastorno y a decidir qué tipo de tratamiento necesita. Los tratamientos pueden incluir lo siguiente:  •Desintoxicación. La desintoxicación consiste en dejar de beber, con la supervisión y las instrucciones de los médicos. El médico puede recetarle medicamentos en la primera semana para ayudar a disminuir los síntomas de la abstinencia. La abstinencia puede ser peligrosa y potencialmente mortal. La desintoxicación puede realizarse en el hogar, en un ámbito extrahospitalario, en un centro de atención primaria, en un hospital o en un centro de tratamiento para abuso de sustancias.    •Asesoramiento psicológico. Puede incluir entrevistas motivacionales (EM), terapia familiar o terapia cognitivo-conductual (TCC). Lo realizan terapeutas profesionales o especializados en el tratamiento de pacientes que abusan de sustancias. Un terapeuta puede abordar cómo cambiar byrne comportamiento de consumo de bebidas alcohólicas y cómo mantener los cambios. La terapia tiene teresa objetivos:  •Identificar siobhan motivaciones positivas para cambiar.      •Identificar y evitar aquello que lo conduzca a beber alcohol.      •Enseñarle a planificar byrne cambio de comportamiento.      •Proponer sistemas de apoyo que puedan ayudarlo a mantener el cambio.      •Medicamentos. Los medicamentos pueden ayudar a tratar tonya trastorno de la siguiente manera:  •Disminuyen el deseo intenso de consumir.      •Reducen el sentimiento positivo que experimenta al beber alcohol.      •Causan belgica reacción física desagradable cuando irais alcohol (terapia de aversión).        •Grupos de apoyo mutuo teresa Alcohólicos Anónimos (AA). Estos grupos son dirigidos por personas que berger superado byrne problema con la bebida. Estos grupos brindan apoyo emocional, consejos y orientación.      Algunas personas con esta afección se benefician del tratamiento combinado que se ofrece en algunos centros de tratamiento especializados en el abuso de sustancias.      Siga estas instrucciones en byrne casa:     Medicamentos     •Use los medicamentos de venta daphney y los recetados solamente teresa se lo haya indicado el médico.      •Consulte antes de empezar a kristie cualquier medicamento, hierbas o suplementos nuevos.      Instrucciones generales     •Pídales a siobhan amistades y familiares que apoyen byrne decisión de mantenerse sobrio.      •Evite situaciones en las que se sirva alcohol.      •Sandhya un plan para lidiar con las situaciones tentadoras.      •Asista a grupos de apoyo con regularidad.      •Practique pasatiempos o actividades que le gusten.      • No conduzca después de beber alcohol.      •Concurra a todas las visitas de seguimiento teresa se lo haya indicado el médico. [place] es importante.        ¿Cómo se previene?  •Si irais alcohol:•Limite la cantidad que irais:  •De 0 a 1 medida por día para las mujeres que no estén embarazadas.      •De 0 a 2 medidas por día para los hombres.        •Esté atento a la cantidad de alcohol que hay en las bebidas que chang. En los [place], belgica medida equivale a belgica botella de cerveza de 12 oz (355 ml), un vaso de vino de 5 oz (148 ml) o un vaso de belgica bebida alcohólica de carol graduación de 1½ oz (44 ml).      •Si tiene belgica afección de lori mental, busque tratamiento. Lleve un estilo de lisbeth saludable, que puede incorporar lo siguiente:  •Meditación o respiración profunda.      •Realizar actividad física.      •Pasar tiempo en contacto con la naturaleza.       •Escuchar música.      •Charlar con algún familiar o amigo de confianza.      •Si eres adolescente:  •No bebas alcohol. Zonia las reuniones en las que puedas tener la tentación de beber.      •No tengas miedo de negarte si alguien te ofrece alcohol. Habla sin reservas acerca de por qué no quieres beber alcohol. Sé un ejemplo positivo para los demás al no consumir alcohol.      •Construye relaciones con amigos que no beban.          Dónde buscar más información    •Substance Abuse and Mental Health Services Administration (Administración de Servicios por Abuso de Sustancias y Lori Mental): samhsa.gov      •Alcohólicos Anónimos: aa.org        Comuníquese con un médico si:    •No puede kristie los medicamentos teresa se lo berger indicado.      •Siobhan síntomas empeoran o tiene síntomas de abstinencia cuando luis f de beber.      •Vuelve a comenzar a beber (recaída) y los síntomas empeoran.        Solicite ayuda de inmediato si:    •Tiene pensamientos acerca de lastimarse a usted mismo o a otras personas.      Si alguna vez siente que puede lastimarse o lastimar a otras personas, o tiene pensamientos de poner fin a byrne lisbeth, busque ayuda de inmediato. Diríjase al departamento de emergencias más cercano o:    • Comuníquese con el servicio de emergencias de byrne localidad (911 en los [place]).       • Llame a belgica línea de asistencia al suicida y atención en crisis teresa National Suicide Prevention Lifeline (Línea Nacional de Prevención del Suicidio) al 1-866.747.3574. Está disponible las 24 horas del día en los . [place].       • Envíe un mensaje de texto a la línea para casos de crisis al 121989 (en los . [place].).         Resumen    •El trastorno por consumo de bebidas alcohólicas es belgica afección en la que el consumo de alcohol altera la lisbeth cotidiana. Las personas con esta afección consumen bebidas alcohólicas en exceso y no pueden controlar byrne consumo.      •El tratamiento puede incluir desintoxicación, asesoramiento psicológico, medicamentos y grupos de apoyo.      •Pida ayuda a amigos y familiares. Evite situaciones en las que se sirva alcohol.      •Busque ayuda de inmediato si tiene pensamientos acerca de lastimarse a usted mismo o a otras personas.      Esta información no tiene teresa fin reemplazar el consejo del médico. Asegúrese de hacerle al médico cualquier pregunta que tenga.

## 2022-05-28 NOTE — ED PROVIDER NOTE - CARE PLAN
1 Principal Discharge DX:	Alcohol abuse   Principal Discharge DX:	Alcohol abuse  Secondary Diagnosis:	Elevated liver enzymes

## 2022-05-28 NOTE — ED ADULT NURSE NOTE - NSIMPLEMENTINTERV_GEN_ALL_ED
Implemented All Fall with Harm Risk Interventions:  [place] to call system. Call bell, personal items and telephone within reach. Instruct patient to call for assistance. Room bathroom lighting operational. Non-slip footwear when patient is off stretcher. Physically safe environment: no spills, clutter or unnecessary equipment. Stretcher in lowest position, wheels locked, appropriate side rails in place. Provide visual cue, wrist band, yellow gown, etc. Monitor gait and stability. Monitor for mental status changes and reorient to person, place, and time. Review medications for side effects contributing to fall risk. Reinforce activity limits and safety measures with patient and family. Provide visual clues: red socks.

## 2022-05-28 NOTE — ED PROVIDER NOTE - PROVIDER TOKENS
PROVIDER:[TOKEN:[[id number]],FOLLOWUP:[1-3 Days]],PROVIDER:[TOKEN:[[id number]],FOLLOWUP:[1-3 Days]] PROVIDER:[TOKEN:[[id number]],FOLLOWUP:[1-3 Days]],PROVIDER:[TOKEN:[[id number]],FOLLOWUP:[1-3 Days]],PROVIDER:[TOKEN:[[id number]],FOLLOWUP:[1-3 Days]]

## 2022-05-28 NOTE — ED PROVIDER NOTE - PATIENT PORTAL LINK FT
You can access the FollowMyHealth Patient Portal offered by [facility] by registering at the following website: [email or web address]. By joining [facility]’s FollowMyHealth portal, you will also be able to view your health information using other applications (apps) compatible with our system.

## 2022-05-28 NOTE — ED PROVIDER NOTE - CARE PROVIDER_API CALL
Mila Almanza)  Internal Medicine  [address] [place] [address]  Phone: [phone]  Fax: [phone]  Follow Up Time: 1-3 Days    Beto Watson)  Cardiovascular Disease; Internal Medicine  [address], [address]  [place] [address]  Phone: [phone]  Fax: [phone]  Follow Up Time: 1-3 Days   Mila Almanza)  Internal Medicine  [address] [place] [address]  Phone: [phone]  Fax: [phone]  Follow Up Time: 1-3 Days    Beto Watson)  Cardiovascular Disease; Internal Medicine  [address]  [place] [address]  Phone: [phone]  Fax: [phone]  Follow Up Time: 1-3 Days    Ar Carver ()  Internal Medicine  [address] [place] [address]  Phone: [phone]  Fax: [phone]  Follow Up Time: 1-3 Days

## 2022-05-28 NOTE — ED PROVIDER NOTE - PROGRESS NOTE DETAILS
spoke with , Dena. will see patient in ED Dena down to see patient. provided resources for detox program. no beds at this time at [facility] or [facility]. patient provided numbers for multiple detox programs. will follow up with primary care provider, referral provided if needed Dena down to see patient. provided resources for detox program. no beds at this time at [facility] or [facility]. patient provided numbers for multiple detox programs. will follow up with primary care provider, referral provided if needed  also discussed liver enzymes are elevated. history of same. referral to GI also provided. speech clear. ambulatory with steady gait

## 2022-12-22 NOTE — ED ADULT TRIAGE NOTE - CHIEF COMPLAINT QUOTE
c/o chest pains for a while pt drinks a lot and last drank just before coming to hospital Abbe Flap (Lower To Upper Lip) Text: The defect of the upper lip was assessed and measured.  Given the location and size of the defect, an Abbe flap was deemed most appropriate.  Using a sterile surgical marker, an appropriate Abbe flap was measured and drawn on the lower lip. Local anesthesia was then infiltrated. A scalpel was then used to incise the upper lip through and through the skin, vermilion, muscle and mucosa, leaving the flap pedicled on the opposite side.  The flap was then rotated and transferred to the lower lip defect.  The flap was then sutured into place with a three layer technique, closing the orbicularis oris muscle layer with subcutaneous buried sutures, followed by a mucosal layer and an epidermal layer.

## 2023-03-22 PROBLEM — F41.9 ANXIETY DISORDER, UNSPECIFIED: Chronic | Status: ACTIVE | Noted: 2022-05-28

## 2023-03-22 PROBLEM — F10.10 ALCOHOL ABUSE, UNCOMPLICATED: Chronic | Status: ACTIVE | Noted: 2022-05-28

## 2023-04-04 ENCOUNTER — OUTPATIENT (OUTPATIENT)
Dept: OUTPATIENT SERVICES | Facility: HOSPITAL | Age: 43
LOS: 1 days | End: 2023-04-04
Payer: SELF-PAY

## 2023-04-04 ENCOUNTER — RESULT CHARGE (OUTPATIENT)
Age: 43
End: 2023-04-04

## 2023-04-04 ENCOUNTER — APPOINTMENT (OUTPATIENT)
Dept: FAMILY MEDICINE | Facility: HOSPITAL | Age: 43
End: 2023-04-04

## 2023-04-04 VITALS
DIASTOLIC BLOOD PRESSURE: 83 MMHG | RESPIRATION RATE: 16 BRPM | BODY MASS INDEX: 34.56 KG/M2 | TEMPERATURE: 98.1 F | SYSTOLIC BLOOD PRESSURE: 143 MMHG | OXYGEN SATURATION: 100 % | WEIGHT: 234 LBS | HEART RATE: 99 BPM

## 2023-04-04 DIAGNOSIS — R22.9 LOCALIZED SWELLING, MASS AND LUMP, UNSPECIFIED: ICD-10-CM

## 2023-04-04 DIAGNOSIS — Z87.898 PERSONAL HISTORY OF OTHER SPECIFIED CONDITIONS: ICD-10-CM

## 2023-04-04 DIAGNOSIS — E66.9 OBESITY, UNSPECIFIED: ICD-10-CM

## 2023-04-04 DIAGNOSIS — R74.01 ELEVATION OF LEVELS OF LIVER TRANSAMINASE LEVELS: ICD-10-CM

## 2023-04-04 DIAGNOSIS — Z00.00 ENCOUNTER FOR GENERAL ADULT MEDICAL EXAMINATION WITHOUT ABNORMAL FINDINGS: ICD-10-CM

## 2023-04-04 DIAGNOSIS — K76.0 FATTY (CHANGE OF) LIVER, NOT ELSEWHERE CLASSIFIED: ICD-10-CM

## 2023-04-04 LAB — HBA1C MFR BLD HPLC: 6.5

## 2023-04-04 PROCEDURE — 83036 HEMOGLOBIN GLYCOSYLATED A1C: CPT

## 2023-04-04 PROCEDURE — G0463: CPT

## 2023-04-04 PROCEDURE — 82043 UR ALBUMIN QUANTITATIVE: CPT

## 2023-04-04 RX ORDER — ATORVASTATIN CALCIUM 40 MG/1
40 TABLET, FILM COATED ORAL
Qty: 1 | Refills: 0 | Status: COMPLETED | COMMUNITY
Start: 2021-10-20 | End: 2023-04-04

## 2023-04-04 NOTE — PHYSICAL EXAM
[Conjunctival Hyperemia Bilateral] : hyperemia [No Respiratory Distress] : no respiratory distress  [No Accessory Muscle Use] : no accessory muscle use [No Focal Deficits] : no focal deficits [Normal Gait] : normal gait [Normal] : affect was normal and insight and judgment were intact [Comprehensive Foot Exam Normal] : Right and left foot were examined and both feet are normal. No ulcers in either foot. Toes are normal and with full ROM.  Normal tactile sensation with monofilament testing throughout both feet

## 2023-04-05 LAB
CREAT SPEC-SCNC: 269 MG/DL
MICROALBUMIN 24H UR DL<=1MG/L-MCNC: <1.2 MG/DL
MICROALBUMIN/CREAT 24H UR-RTO: NORMAL MG/G

## 2023-04-07 DIAGNOSIS — E66.9 OBESITY, UNSPECIFIED: ICD-10-CM

## 2023-04-07 DIAGNOSIS — E11.9 TYPE 2 DIABETES MELLITUS WITHOUT COMPLICATIONS: ICD-10-CM

## 2023-04-07 DIAGNOSIS — R74.01 ELEVATION OF LEVELS OF LIVER TRANSAMINASE LEVELS: ICD-10-CM

## 2023-04-18 ENCOUNTER — NON-APPOINTMENT (OUTPATIENT)
Age: 43
End: 2023-04-18

## 2023-04-18 PROBLEM — E11.9 CONTROLLED DIABETES MELLITUS: Status: ACTIVE | Noted: 2018-03-27

## 2023-04-19 ENCOUNTER — OUTPATIENT (OUTPATIENT)
Dept: OUTPATIENT SERVICES | Facility: HOSPITAL | Age: 43
LOS: 1 days | End: 2023-04-19
Payer: SELF-PAY

## 2023-04-19 ENCOUNTER — NON-APPOINTMENT (OUTPATIENT)
Age: 43
End: 2023-04-19

## 2023-04-19 ENCOUNTER — APPOINTMENT (OUTPATIENT)
Dept: FAMILY MEDICINE | Facility: HOSPITAL | Age: 43
End: 2023-04-19

## 2023-04-19 VITALS
RESPIRATION RATE: 16 BRPM | TEMPERATURE: 98.1 F | SYSTOLIC BLOOD PRESSURE: 116 MMHG | WEIGHT: 231 LBS | HEART RATE: 72 BPM | OXYGEN SATURATION: 98 % | BODY MASS INDEX: 34.11 KG/M2 | DIASTOLIC BLOOD PRESSURE: 78 MMHG

## 2023-04-19 DIAGNOSIS — Z00.00 ENCOUNTER FOR GENERAL ADULT MEDICAL EXAMINATION WITHOUT ABNORMAL FINDINGS: ICD-10-CM

## 2023-04-19 DIAGNOSIS — H04.123 DRY EYE SYNDROME OF BILATERAL LACRIMAL GLANDS: ICD-10-CM

## 2023-04-19 DIAGNOSIS — E11.9 TYPE 2 DIABETES MELLITUS W/OUT COMPLICATIONS: ICD-10-CM

## 2023-04-19 DIAGNOSIS — Z23 ENCOUNTER FOR IMMUNIZATION: ICD-10-CM

## 2023-04-19 PROCEDURE — G0463: CPT

## 2023-04-20 NOTE — PHYSICAL EXAM
[No Acute Distress] : no acute distress [Well Nourished] : well nourished [Normal Rate] : normal rate  [Regular Rhythm] : with a regular rhythm [Coordination Grossly Intact] : coordination grossly intact [No Focal Deficits] : no focal deficits [Normal Affect] : the affect was normal [Normal Insight/Judgement] : insight and judgment were intact [Well-Appearing] : well-appearing [Normal] : no rash [de-identified] : L eye with pterygium

## 2023-04-20 NOTE — HISTORY OF PRESENT ILLNESS
[FreeTextEntry1] : DM eye exam [de-identified] : Pt is a 44yo M w PMHx of T2DM, fatty liver, obesity presenting for funduscopic exam. Pt denies any acute complaints. Denies any changes in vision since last being seen. Pt's last HbA1C was 6.5 on 4/4/23. Pt's last CMP/Urine Creatine demonstrates normal renal function. Pt's diabetes is currently managed on metformin 500qd. Pt does not have next appt set up with PCP. No other questions or concerns at this time.

## 2023-04-21 DIAGNOSIS — E11.9 TYPE 2 DIABETES MELLITUS WITHOUT COMPLICATIONS: ICD-10-CM

## 2023-05-03 NOTE — HISTORY OF PRESENT ILLNESS
[FreeTextEntry1] : FU Type 2 DM and Fatty liver [de-identified] : 42 yo obese M with PMHx of T2DM, fatty liver presents for FU, pt has not been to clinic since Nov 2021, and since then reports has not taken medication or be compliant with diet and lifestyle changes. He states is eager to start diet and exercise for the summer.

## 2023-05-03 NOTE — END OF VISIT
[] : Resident [FreeTextEntry3] : agree with plan to restart metformin and obtain follow up labs for this patient, wieght loss and diet are paramount in his therapy

## 2023-08-08 RX ORDER — METFORMIN HYDROCHLORIDE 500 MG/1
500 TABLET, COATED ORAL DAILY
Qty: 90 | Refills: 1 | Status: ACTIVE | COMMUNITY
Start: 2021-10-20 | End: 1900-01-01

## 2023-08-17 ENCOUNTER — NON-APPOINTMENT (OUTPATIENT)
Age: 43
End: 2023-08-17

## 2023-08-17 ENCOUNTER — APPOINTMENT (OUTPATIENT)
Dept: OPHTHALMOLOGY | Facility: CLINIC | Age: 43
End: 2023-08-17
Payer: SELF-PAY

## 2023-08-17 PROCEDURE — 92004 COMPRE OPH EXAM NEW PT 1/>: CPT

## 2023-08-17 PROCEDURE — 92134 CPTRZ OPH DX IMG PST SGM RTA: CPT

## 2023-08-17 PROCEDURE — 92285 EXTERNAL OCULAR PHOTOGRAPHY: CPT

## 2023-08-18 ENCOUNTER — APPOINTMENT (OUTPATIENT)
Dept: FAMILY MEDICINE | Facility: HOSPITAL | Age: 43
End: 2023-08-18

## 2025-03-28 ENCOUNTER — OUTPATIENT (OUTPATIENT)
Dept: OUTPATIENT SERVICES | Facility: HOSPITAL | Age: 45
LOS: 1 days | End: 2025-03-28
Payer: SELF-PAY

## 2025-03-28 ENCOUNTER — APPOINTMENT (OUTPATIENT)
Age: 45
End: 2025-03-28

## 2025-03-28 VITALS
TEMPERATURE: 98.2 F | RESPIRATION RATE: 16 BRPM | DIASTOLIC BLOOD PRESSURE: 78 MMHG | SYSTOLIC BLOOD PRESSURE: 126 MMHG | HEART RATE: 86 BPM | BODY MASS INDEX: 35.4 KG/M2 | OXYGEN SATURATION: 98 % | HEIGHT: 69 IN | WEIGHT: 239 LBS

## 2025-03-28 DIAGNOSIS — E11.9 TYPE 2 DIABETES MELLITUS W/OUT COMPLICATIONS: ICD-10-CM

## 2025-03-28 DIAGNOSIS — Z00.00 ENCOUNTER FOR GENERAL ADULT MEDICAL EXAMINATION W/OUT ABNORMAL FINDINGS: ICD-10-CM

## 2025-03-28 DIAGNOSIS — F17.210 NICOTINE DEPENDENCE, CIGARETTES, UNCOMPLICATED: ICD-10-CM

## 2025-03-28 DIAGNOSIS — E66.9 OBESITY, UNSPECIFIED: ICD-10-CM

## 2025-03-28 DIAGNOSIS — Z23 ENCOUNTER FOR IMMUNIZATION: ICD-10-CM

## 2025-03-28 DIAGNOSIS — Z00.00 ENCOUNTER FOR GENERAL ADULT MEDICAL EXAMINATION WITHOUT ABNORMAL FINDINGS: ICD-10-CM

## 2025-03-28 LAB — HBA1C MFR BLD HPLC: 9.6

## 2025-03-28 RX ORDER — METFORMIN HYDROCHLORIDE 1000 MG/1
1000 TABLET, COATED ORAL TWICE DAILY
Qty: 180 | Refills: 3 | Status: ACTIVE | COMMUNITY
Start: 2025-03-28 | End: 1900-01-01

## 2025-03-29 DIAGNOSIS — E66.9 OBESITY, UNSPECIFIED: ICD-10-CM

## 2025-03-29 DIAGNOSIS — F17.210 NICOTINE DEPENDENCE, CIGARETTES, UNCOMPLICATED: ICD-10-CM

## 2025-03-29 DIAGNOSIS — E11.9 TYPE 2 DIABETES MELLITUS WITHOUT COMPLICATIONS: ICD-10-CM

## 2025-03-29 LAB
CREAT SPEC-SCNC: 257 MG/DL
MICROALBUMIN 24H UR DL<=1MG/L-MCNC: <1.2 MG/DL
MICROALBUMIN/CREAT 24H UR-RTO: NORMAL MG/G

## 2025-03-31 LAB
ALBUMIN SERPL ELPH-MCNC: 4.4 G/DL
ALP BLD-CCNC: 102 U/L
ALT SERPL-CCNC: 70 U/L
ANION GAP SERPL CALC-SCNC: 12 MMOL/L
AST SERPL-CCNC: 35 U/L
BILIRUB SERPL-MCNC: 0.5 MG/DL
BUN SERPL-MCNC: 19 MG/DL
CALCIUM SERPL-MCNC: 9.5 MG/DL
CHLORIDE SERPL-SCNC: 103 MMOL/L
CHOLEST SERPL-MCNC: 204 MG/DL
CO2 SERPL-SCNC: 24 MMOL/L
CREAT SERPL-MCNC: 0.8 MG/DL
EGFRCR SERPLBLD CKD-EPI 2021: 112 ML/MIN/1.73M2
GLUCOSE SERPL-MCNC: 185 MG/DL
HCT VFR BLD CALC: 43.7 %
HDLC SERPL-MCNC: 48 MG/DL
HGB BLD-MCNC: 14.2 G/DL
LDLC SERPL-MCNC: 133 MG/DL
MCHC RBC-ENTMCNC: 28.2 PG
MCHC RBC-ENTMCNC: 32.5 G/DL
MCV RBC AUTO: 86.9 FL
NONHDLC SERPL-MCNC: 156 MG/DL
PLATELET # BLD AUTO: 211 K/UL
POTASSIUM SERPL-SCNC: 4.6 MMOL/L
PROT SERPL-MCNC: 6.7 G/DL
RBC # BLD: 5.03 M/UL
RBC # FLD: 13.6 %
SODIUM SERPL-SCNC: 139 MMOL/L
TRIGL SERPL-MCNC: 126 MG/DL
TSH SERPL-ACNC: 1.61 UIU/ML
WBC # FLD AUTO: 6.62 K/UL

## 2025-03-31 PROCEDURE — G0463: CPT

## 2025-03-31 PROCEDURE — 83036 HEMOGLOBIN GLYCOSYLATED A1C: CPT

## 2025-03-31 PROCEDURE — 80053 COMPREHEN METABOLIC PANEL: CPT

## 2025-03-31 PROCEDURE — 85027 COMPLETE CBC AUTOMATED: CPT

## 2025-03-31 PROCEDURE — 84443 ASSAY THYROID STIM HORMONE: CPT

## 2025-03-31 PROCEDURE — 82043 UR ALBUMIN QUANTITATIVE: CPT

## 2025-03-31 PROCEDURE — 80061 LIPID PANEL: CPT

## 2025-06-26 ENCOUNTER — OUTPATIENT (OUTPATIENT)
Dept: OUTPATIENT SERVICES | Facility: HOSPITAL | Age: 45
LOS: 1 days | End: 2025-06-26
Payer: SELF-PAY

## 2025-06-26 ENCOUNTER — APPOINTMENT (OUTPATIENT)
Age: 45
End: 2025-06-26

## 2025-06-26 ENCOUNTER — RESULT CHARGE (OUTPATIENT)
Age: 45
End: 2025-06-26

## 2025-06-26 ENCOUNTER — NON-APPOINTMENT (OUTPATIENT)
Age: 45
End: 2025-06-26

## 2025-06-26 VITALS
SYSTOLIC BLOOD PRESSURE: 124 MMHG | OXYGEN SATURATION: 97 % | HEART RATE: 75 BPM | TEMPERATURE: 75 F | DIASTOLIC BLOOD PRESSURE: 81 MMHG | WEIGHT: 232 LBS | BODY MASS INDEX: 34.26 KG/M2 | RESPIRATION RATE: 16 BRPM

## 2025-06-26 DIAGNOSIS — K43.9 VENTRAL HERNIA WITHOUT OBSTRUCTION OR GANGRENE: ICD-10-CM

## 2025-06-26 DIAGNOSIS — Z00.00 ENCOUNTER FOR GENERAL ADULT MEDICAL EXAMINATION WITHOUT ABNORMAL FINDINGS: ICD-10-CM

## 2025-06-26 DIAGNOSIS — E11.9 TYPE 2 DIABETES MELLITUS WITHOUT COMPLICATIONS: ICD-10-CM

## 2025-06-26 PROCEDURE — 83036 HEMOGLOBIN GLYCOSYLATED A1C: CPT

## 2025-06-26 PROCEDURE — G0463: CPT

## 2025-06-26 RX ORDER — ATORVASTATIN CALCIUM 10 MG/1
10 TABLET, FILM COATED ORAL
Qty: 30 | Refills: 1 | Status: ACTIVE | COMMUNITY
Start: 2025-06-26 | End: 1900-01-01

## 2025-07-21 ENCOUNTER — APPOINTMENT (OUTPATIENT)
Age: 45
End: 2025-07-21

## 2025-07-21 ENCOUNTER — OUTPATIENT (OUTPATIENT)
Dept: OUTPATIENT SERVICES | Facility: HOSPITAL | Age: 45
LOS: 1 days | End: 2025-07-21
Payer: SELF-PAY

## 2025-07-21 VITALS
RESPIRATION RATE: 16 BRPM | TEMPERATURE: 70 F | BODY MASS INDEX: 33.97 KG/M2 | SYSTOLIC BLOOD PRESSURE: 125 MMHG | DIASTOLIC BLOOD PRESSURE: 77 MMHG | OXYGEN SATURATION: 98 % | WEIGHT: 230 LBS | HEART RATE: 74 BPM

## 2025-07-21 DIAGNOSIS — F17.210 NICOTINE DEPENDENCE, CIGARETTES, UNCOMPLICATED: ICD-10-CM

## 2025-07-21 DIAGNOSIS — R74.01 ELEVATION OF LEVELS OF LIVER TRANSAMINASE LEVELS: ICD-10-CM

## 2025-07-21 DIAGNOSIS — E11.9 TYPE 2 DIABETES MELLITUS W/OUT COMPLICATIONS: ICD-10-CM

## 2025-07-21 DIAGNOSIS — E78.00 PURE HYPERCHOLESTEROLEMIA, UNSPECIFIED: ICD-10-CM

## 2025-07-21 DIAGNOSIS — Z00.00 ENCOUNTER FOR GENERAL ADULT MEDICAL EXAMINATION WITHOUT ABNORMAL FINDINGS: ICD-10-CM

## 2025-07-21 PROCEDURE — 36415 COLL VENOUS BLD VENIPUNCTURE: CPT

## 2025-07-21 PROCEDURE — G0463: CPT

## 2025-07-21 PROCEDURE — 80053 COMPREHEN METABOLIC PANEL: CPT

## 2025-07-21 RX ORDER — METFORMIN HYDROCHLORIDE 1000 MG/1
1000 TABLET, COATED ORAL DAILY
Qty: 30 | Refills: 3 | Status: ACTIVE | COMMUNITY
Start: 2025-07-21 | End: 1900-01-01

## 2025-07-22 DIAGNOSIS — K43.9 VENTRAL HERNIA WITHOUT OBSTRUCTION OR GANGRENE: ICD-10-CM

## 2025-07-22 DIAGNOSIS — R74.01 ELEVATION OF LEVELS OF LIVER TRANSAMINASE LEVELS: ICD-10-CM

## 2025-07-22 DIAGNOSIS — E11.9 TYPE 2 DIABETES MELLITUS WITHOUT COMPLICATIONS: ICD-10-CM

## 2025-07-22 DIAGNOSIS — E78.00 PURE HYPERCHOLESTEROLEMIA, UNSPECIFIED: ICD-10-CM

## 2025-07-25 LAB
ALBUMIN SERPL ELPH-MCNC: 4.7 G/DL
ALP BLD-CCNC: 110 U/L
ALT SERPL-CCNC: 29 U/L
ANION GAP SERPL CALC-SCNC: 15 MMOL/L
AST SERPL-CCNC: 27 U/L
BILIRUB SERPL-MCNC: 0.2 MG/DL
BUN SERPL-MCNC: 25 MG/DL
CALCIUM SERPL-MCNC: 9.8 MG/DL
CHLORIDE SERPL-SCNC: 103 MMOL/L
CO2 SERPL-SCNC: 22 MMOL/L
CREAT SERPL-MCNC: 0.88 MG/DL
EGFRCR SERPLBLD CKD-EPI 2021: 109 ML/MIN/1.73M2
GLUCOSE SERPL-MCNC: 114 MG/DL
POTASSIUM SERPL-SCNC: 4.4 MMOL/L
PROT SERPL-MCNC: 7.2 G/DL
SODIUM SERPL-SCNC: 141 MMOL/L

## 2025-08-06 ENCOUNTER — OUTPATIENT (OUTPATIENT)
Dept: OUTPATIENT SERVICES | Facility: HOSPITAL | Age: 45
LOS: 1 days | End: 2025-08-06
Payer: SELF-PAY

## 2025-08-06 ENCOUNTER — APPOINTMENT (OUTPATIENT)
Age: 45
End: 2025-08-06

## 2025-08-06 VITALS
WEIGHT: 231 LBS | DIASTOLIC BLOOD PRESSURE: 76 MMHG | TEMPERATURE: 98 F | BODY MASS INDEX: 34.11 KG/M2 | HEART RATE: 63 BPM | SYSTOLIC BLOOD PRESSURE: 121 MMHG | OXYGEN SATURATION: 97 % | RESPIRATION RATE: 14 BRPM

## 2025-08-06 DIAGNOSIS — K43.9 VENTRAL HERNIA WITHOUT OBSTRUCTION OR GANGRENE: ICD-10-CM

## 2025-08-06 DIAGNOSIS — K43.9 VENTRAL HERNIA W/OUT OBSTRUCTION OR GANGRENE: ICD-10-CM

## 2025-08-06 DIAGNOSIS — Z00.00 ENCOUNTER FOR GENERAL ADULT MEDICAL EXAMINATION WITHOUT ABNORMAL FINDINGS: ICD-10-CM

## 2025-08-06 PROCEDURE — G0463: CPT

## 2025-08-28 ENCOUNTER — NON-APPOINTMENT (OUTPATIENT)
Age: 45
End: 2025-08-28

## 2025-08-28 ENCOUNTER — APPOINTMENT (OUTPATIENT)
Dept: OPHTHALMOLOGY | Facility: CLINIC | Age: 45
End: 2025-08-28
Payer: COMMERCIAL

## 2025-08-28 PROCEDURE — 92134 CPTRZ OPH DX IMG PST SGM RTA: CPT

## 2025-08-28 PROCEDURE — 92014 COMPRE OPH EXAM EST PT 1/>: CPT

## 2025-08-28 PROCEDURE — 92025 CPTRIZED CORNEAL TOPOGRAPHY: CPT
